# Patient Record
Sex: FEMALE | Race: BLACK OR AFRICAN AMERICAN | NOT HISPANIC OR LATINO | RURAL
[De-identification: names, ages, dates, MRNs, and addresses within clinical notes are randomized per-mention and may not be internally consistent; named-entity substitution may affect disease eponyms.]

---

## 2024-04-11 ENCOUNTER — PROCEDURE VISIT (OUTPATIENT)
Dept: OBSTETRICS AND GYNECOLOGY | Facility: CLINIC | Age: 24
End: 2024-04-11
Payer: COMMERCIAL

## 2024-04-11 ENCOUNTER — OFFICE VISIT (OUTPATIENT)
Dept: OBSTETRICS AND GYNECOLOGY | Facility: CLINIC | Age: 24
End: 2024-04-11
Payer: COMMERCIAL

## 2024-04-11 VITALS
TEMPERATURE: 98 F | HEART RATE: 79 BPM | BODY MASS INDEX: 21.19 KG/M2 | DIASTOLIC BLOOD PRESSURE: 81 MMHG | RESPIRATION RATE: 18 BRPM | WEIGHT: 135 LBS | SYSTOLIC BLOOD PRESSURE: 113 MMHG | OXYGEN SATURATION: 99 % | HEIGHT: 67 IN

## 2024-04-11 DIAGNOSIS — N91.1 SECONDARY AMENORRHEA: Primary | ICD-10-CM

## 2024-04-11 DIAGNOSIS — R11.0 NAUSEA: ICD-10-CM

## 2024-04-11 LAB
B-HCG UR QL: POSITIVE
CTP QC/QA: YES

## 2024-04-11 PROCEDURE — 99204 OFFICE O/P NEW MOD 45 MIN: CPT | Mod: ,,, | Performed by: ADVANCED PRACTICE MIDWIFE

## 2024-04-11 PROCEDURE — 99499 UNLISTED E&M SERVICE: CPT | Mod: ,,, | Performed by: OBSTETRICS & GYNECOLOGY

## 2024-04-11 PROCEDURE — 3008F BODY MASS INDEX DOCD: CPT | Mod: ,,, | Performed by: ADVANCED PRACTICE MIDWIFE

## 2024-04-11 PROCEDURE — 1159F MED LIST DOCD IN RCRD: CPT | Mod: ,,, | Performed by: ADVANCED PRACTICE MIDWIFE

## 2024-04-11 PROCEDURE — 3079F DIAST BP 80-89 MM HG: CPT | Mod: ,,, | Performed by: ADVANCED PRACTICE MIDWIFE

## 2024-04-11 PROCEDURE — 81025 URINE PREGNANCY TEST: CPT | Mod: QW,,, | Performed by: ADVANCED PRACTICE MIDWIFE

## 2024-04-11 PROCEDURE — 76801 OB US < 14 WKS SINGLE FETUS: CPT | Mod: ,,, | Performed by: OBSTETRICS & GYNECOLOGY

## 2024-04-11 PROCEDURE — 3074F SYST BP LT 130 MM HG: CPT | Mod: ,,, | Performed by: ADVANCED PRACTICE MIDWIFE

## 2024-04-11 RX ORDER — ONDANSETRON 8 MG/1
8 TABLET, ORALLY DISINTEGRATING ORAL 3 TIMES DAILY
Qty: 30 TABLET | Refills: 2 | Status: SHIPPED | OUTPATIENT
Start: 2024-04-11

## 2024-04-11 RX ORDER — ERGOCALCIFEROL 1.25 MG/1
50000 CAPSULE ORAL
Qty: 5 CAPSULE | Refills: 3 | Status: SHIPPED | OUTPATIENT
Start: 2024-04-11 | End: 2024-05-10

## 2024-04-11 NOTE — LETTER
April 11, 2024    Negro Park  [address] [address]              [facility]N  [address] [place] [id number]  Phone: [phone]  Fax: [phone]    To Whom It May Concern:    Ms. Park is currently under our care for pregnancy.  Estimated Date of Delivery: 12/08/2024        Sincerely,    Chuyita Toscano MA

## 2024-04-11 NOTE — PROGRESS NOTES
Missed Menses/ Possible Pregnancy  Patient complains of amenorrhea. She believes she could be pregnant. Pregnancy is desired. Sexual Activity: single partner, contraception: none. Current symptoms also include: fatigue, frequent urination, positive home pregnancy test, and cramping . Last period was normal.     Patient's last menstrual period was 2024 (exact date).     Review of patient's allergies indicates:   Allergen Reactions    Benadryl allergy-sinus Hives       Past Medical History:   Diagnosis Date    History of irritable bowel syndrome      History reviewed. No pertinent surgical history.  OB History          1    Para        Term                AB        Living             SAB        IAB        Ectopic        Multiple        Live Births                   Family History   Problem Relation Age of Onset    Ovarian cancer Maternal Grandmother     Diabetes Father     Hypertension Mother     Hypertension Sister      Social History     Tobacco Use    Smoking status: Former     Types: Cigarettes     Passive exposure: Never    Smokeless tobacco: Never   Substance Use Topics    Alcohol use: Not Currently     Genetic History reviewed    Current Outpatient Medications   Medication Sig    ergocalciferol (VITAMIN D2) 50,000 unit Cap Take 1 capsule (50,000 Units total) by mouth every 7 days. for 5 doses    ondansetron (ZOFRAN-ODT) 8 MG TbDL Take 1 tablet (8 mg total) by mouth 3 (three) times daily.    prenatal no118-iron-folic acid 29 mg iron- 1 mg Chew Take 1 tablet by mouth once daily.     No current facility-administered medications for this visit.       OB History    Para Term  AB Living   1             SAB IAB Ectopic Multiple Live Births                  # Outcome Date GA Lbr Tyson/2nd Weight Sex Delivery Anes PTL Lv   1 Current                 Review of Systems  ROS:  GENERAL: No fever, chills, fatigability or weight loss.  VULVAR: No pain, no lesions and no itching.  VAGINAL: No  relaxation, no itching, no discharge, no abnormal bleeding and no lesions.  ABDOMEN: No abdominal pain. Denies nausea. Denies vomiting. No diarrhea. No constipation  BREAST: Denies pain. No lumps. No discharge.  URINARY: No incontinence, no nocturia, no frequency and no dysuria.  CARDIOVASCULAR: No chest pain. No shortness of breath. No leg cramps.  NEUROLOGICAL: no headaches. No vision changes.    Objective:     PE:  AFFECT: Calm, alert and oriented X 3. Interactive during exam  GENERAL: Appears well-nourished, well-developed, in no acute distress.  HEAD: Normocephalic, atruamatic  TEETH: Good dentition.  THYROID: No thyromegally   BREASTS: No masses, skin changes, nipple discharge or adenopathy bilaterally.  SKIN: Normal for race, warm, & dry. No lesions or rashes.  LUNGS: Easy and unlabored, clear to auscultation bilaterally.  HEART: Regular rate and rhythm   EXTREMITIES: No cyanosis, clubbing or edema. No calf tenderness.    Pregnancy test: positive  EDC: 12/8/2024  US today: EDC 12/10/2024 @ 5 weeks 2 days, rincon, IUP, FHTs @ 100 bpm     Assessment:     Negro was seen today for routine prenatal visit.    Diagnoses and all orders for this visit:    Secondary amenorrhea  -     POCT urine pregnancy  -     ergocalciferol (VITAMIN D2) 50,000 unit Cap; Take 1 capsule (50,000 Units total) by mouth every 7 days. for 5 doses  -     prenatal no118-iron-folic acid 29 mg iron- 1 mg Chew; Take 1 tablet by mouth once daily.    Nausea  -     ondansetron (ZOFRAN-ODT) 8 MG TbDL; Take 1 tablet (8 mg total) by mouth 3 (three) times daily.        ICD-10-CM ICD-9-CM    1. Secondary amenorrhea  N91.1 626.0 POCT urine pregnancy      ergocalciferol (VITAMIN D2) 50,000 unit Cap      prenatal no118-iron-folic acid 29 mg iron- 1 mg Chew      2. Nausea  R11.0 787.02 ondansetron (ZOFRAN-ODT) 8 MG TbDL           Plan:     Oriented to practice  Bleeding precautions discussed. If noted, follow up a the emergency room or clinic if  scheduled for evaluation.  Counseled to avoid cat litter boxes.  Avoid gardening without gloves  Avoid half cooked, rare, or raw meats.  Avoid foods high in nitrites such as cold cuts- heat up any deli meats.  Seafood no more than 3 times per week or may eat large fish like tuna no more than once a week.  Avoid soft unpasteurized cheeses.  I recommend a PNV daily.    She should avoid ibuprofen, aleve, advil, BC powders.  Pink OB bag with prenatal book and information provided  Weight gain in pregnancy discussed  Vitamin D daily  Prenatal vitamins daily  B6 and unisom prn nausea  Verbalized understanding to all instructions and information  Questions answered to desired level of satisfaction      Follow up if symptoms worsen or fail to improve, for 1 wk for OB annual, f/u in 4 weeks NOB with labs and repeat US.    Mariia Reid, DNP, CNM, WHNP-BC

## 2024-04-18 ENCOUNTER — OFFICE VISIT (OUTPATIENT)
Dept: OBSTETRICS AND GYNECOLOGY | Facility: CLINIC | Age: 24
End: 2024-04-18
Payer: COMMERCIAL

## 2024-04-18 VITALS
BODY MASS INDEX: 20.9 KG/M2 | SYSTOLIC BLOOD PRESSURE: 106 MMHG | TEMPERATURE: 98 F | WEIGHT: 133.19 LBS | HEIGHT: 67 IN | DIASTOLIC BLOOD PRESSURE: 74 MMHG | OXYGEN SATURATION: 99 % | HEART RATE: 78 BPM

## 2024-04-18 DIAGNOSIS — Z12.4 ENCOUNTER FOR SCREENING FOR MALIGNANT NEOPLASM OF CERVIX: ICD-10-CM

## 2024-04-18 DIAGNOSIS — Z01.419 WELL WOMAN EXAM WITH ROUTINE GYNECOLOGICAL EXAM: Primary | ICD-10-CM

## 2024-04-18 DIAGNOSIS — Z11.3 SCREEN FOR SEXUALLY TRANSMITTED DISEASES: ICD-10-CM

## 2024-04-18 DIAGNOSIS — Z72.51 HIGH RISK HETEROSEXUAL BEHAVIOR: ICD-10-CM

## 2024-04-18 LAB
CANDIDA SPECIES: NEGATIVE
GARDNERELLA: POSITIVE
TRICHOMONAS: NEGATIVE

## 2024-04-18 PROCEDURE — 87660 TRICHOMONAS VAGIN DIR PROBE: CPT | Mod: ,,, | Performed by: CLINICAL MEDICAL LABORATORY

## 2024-04-18 PROCEDURE — 87591 N.GONORRHOEAE DNA AMP PROB: CPT | Mod: ,,, | Performed by: CLINICAL MEDICAL LABORATORY

## 2024-04-18 PROCEDURE — 3008F BODY MASS INDEX DOCD: CPT | Mod: ,,, | Performed by: ADVANCED PRACTICE MIDWIFE

## 2024-04-18 PROCEDURE — 87491 CHLMYD TRACH DNA AMP PROBE: CPT | Mod: ,,, | Performed by: CLINICAL MEDICAL LABORATORY

## 2024-04-18 PROCEDURE — 3078F DIAST BP <80 MM HG: CPT | Mod: ,,, | Performed by: ADVANCED PRACTICE MIDWIFE

## 2024-04-18 PROCEDURE — 3074F SYST BP LT 130 MM HG: CPT | Mod: ,,, | Performed by: ADVANCED PRACTICE MIDWIFE

## 2024-04-18 PROCEDURE — 99459 PELVIC EXAMINATION: CPT | Mod: ,,, | Performed by: ADVANCED PRACTICE MIDWIFE

## 2024-04-18 PROCEDURE — 1159F MED LIST DOCD IN RCRD: CPT | Mod: ,,, | Performed by: ADVANCED PRACTICE MIDWIFE

## 2024-04-18 PROCEDURE — 87480 CANDIDA DNA DIR PROBE: CPT | Mod: ,,, | Performed by: CLINICAL MEDICAL LABORATORY

## 2024-04-18 PROCEDURE — 99395 PREV VISIT EST AGE 18-39: CPT | Mod: ,,, | Performed by: ADVANCED PRACTICE MIDWIFE

## 2024-04-18 PROCEDURE — 87510 GARDNER VAG DNA DIR PROBE: CPT | Mod: ,,, | Performed by: CLINICAL MEDICAL LABORATORY

## 2024-04-18 NOTE — PROGRESS NOTES
"CC: Here for pap smear, annual exam    Negro Park is a 23 y.o. female  presents for well woman exam.  LMP: Patient's last menstrual period was 2024 (exact date).. Menses are: normal. Denies any further issues, problems, or complaints.    Last mammogram: n/a  Colonoscopy: n/a    Past Medical History:   Diagnosis Date    History of irritable bowel syndrome      No past surgical history on file.  Social History     Socioeconomic History    Marital status: Significant Other   Tobacco Use    Smoking status: Former     Types: Cigarettes     Passive exposure: Never    Smokeless tobacco: Never   Substance and Sexual Activity    Alcohol use: Not Currently    Sexual activity: Not Currently     Partners: Male     Family History   Problem Relation Name Age of Onset    Ovarian cancer Maternal Grandmother      Diabetes Father      Hypertension Mother      Hypertension Sister       OB History          1    Para        Term                AB        Living             SAB        IAB        Ectopic        Multiple        Live Births                     /74   Pulse 78   Temp 98.2 °F (36.8 °C)   Ht 5' 7" (1.702 m)   Wt 60.4 kg (133 lb 3.2 oz)   LMP 2024 (Exact Date)   SpO2 99%   BMI 20.86 kg/m²       ROS:  GENERAL: Denies weight gain or weight loss. Feeling well overall.   SKIN: Denies rash or lesions.   HEAD: Denies head injury or headache.   NODES: Denies enlarged lymph nodes.   CHEST: Denies chest pain or shortness of breath.   CARDIOVASCULAR: Denies palpitations or left sided chest pain.   ABDOMEN: No abdominal pain, constipation, diarrhea, nausea, vomiting or rectal bleeding.   URINARY: No frequency, dysuria, hematuria, or burning on urination.  REPRODUCTIVE: See HPI.   BREASTS: The patient performs breast self-examination and denies pain, lumps, or nipple discharge.   HEMATOLOGIC: No easy bruisability or excessive bleeding.   MUSCULOSKELETAL: Denies joint pain or swelling. "   NEUROLOGIC: Denies syncope or weakness.   PSYCHIATRIC: Denies depression, anxiety or mood swings.    PHYSICAL EXAM:  APPEARANCE: Well nourished, well developed, in no acute distress.  AFFECT: WNL, alert and oriented x 3  SKIN: No acne or hirsutism  NECK: Neck symmetric without masses or thyromegaly  NODES: No inguinal, cervical, axillary, or femoral lymph node enlargement  CHEST: Good respiratory effect  ABDOMEN: Soft.  No tenderness or masses.  No hepatosplenomegaly.  No hernias.  BREASTS: Symmetrical, no skin changes or visible lesions.  No palpable masses, nipple discharge bilaterally.  PELVIC: Normal external genitalia without lesions.  Normal hair distribution.  Adequate perineal body, normal urethral meatus.  Vagina moist and well rugated without lesions, thick white discharge.  Cervix pink, without lesions, tenderness with thick white discharge.  No significant cystocele or rectocele.  Bimanual exam shows uterus to be normal size, regular, mobile and nontender.  Adnexa without masses or tenderness.  Chaperone present for exam.   EXTREMITIES: No edema.    Well woman exam with routine gynecological exam  -     ThinPrep Pap Test; Future; Expected date: 04/18/2024    Encounter for screening for malignant neoplasm of cervix  -     ThinPrep Pap Test; Future; Expected date: 04/18/2024    Screen for sexually transmitted diseases  -     Chlamydia/GC, PCR; Future; Expected date: 04/18/2024  -     Bacterial Vaginosis; Future; Expected date: 04/18/2024    High risk heterosexual behavior  -     Chlamydia/GC, PCR; Future; Expected date: 04/18/2024  -     Bacterial Vaginosis; Future; Expected date: 04/18/2024        ICD-10-CM ICD-9-CM    1. Well woman exam with routine gynecological exam  Z01.419 V72.31 ThinPrep Pap Test      2. Encounter for screening for malignant neoplasm of cervix  Z12.4 V76.2 ThinPrep Pap Test      3. Screen for sexually transmitted diseases  Z11.3 V74.5 Chlamydia/GC, PCR      Bacterial Vaginosis       4. High risk heterosexual behavior  Z72.51 V69.2 Chlamydia/GC, PCR      Bacterial Vaginosis          Patient was counseled today on A.C.S. Pap guidelines and recommendations for yearly pelvic exams, mammograms and monthly self breast exams; to see her PCP for other health maintenance.   Exercise regimen encouraged  Healthy food choices encouraged  Multivitamins daily  Vitamin D daily  Release of information signed and sent to obtain pap smear obtained [facility] in [place], [place].   Questions answered to desired level of satisfaction  Verbalized understanding to all information and instructions    Follow up in about 1 year (around 4/18/2025), or if symptoms worsen or fail to improve, for OB Annual.

## 2024-04-19 LAB
CHLAMYDIA BY PCR: NEGATIVE
N. GONORRHOEAE (GC) BY PCR: NEGATIVE

## 2024-04-22 RX ORDER — METRONIDAZOLE 500 MG/1
500 TABLET ORAL 2 TIMES DAILY
Qty: 14 TABLET | Refills: 0 | Status: SHIPPED | OUTPATIENT
Start: 2024-04-22 | End: 2024-04-29

## 2024-05-05 NOTE — PROCEDURES
New OB Ultrasound note:      Uterus 6.61 x 4.69 x 4.58 cm     Gestational sac 5 weeks 2 day   Fetal heart rate 100 beats per minute      Impression:    IUP with fetal heart tones   Estimated gestational age 5 weeks 2 day  Estimated delivery date December 10,2024  Early IUP   Recommend repeat ultrasound in 1 week

## 2024-05-08 ENCOUNTER — ROUTINE PRENATAL (OUTPATIENT)
Dept: OBSTETRICS AND GYNECOLOGY | Facility: CLINIC | Age: 24
End: 2024-05-08
Payer: COMMERCIAL

## 2024-05-08 ENCOUNTER — PROCEDURE VISIT (OUTPATIENT)
Dept: OBSTETRICS AND GYNECOLOGY | Facility: CLINIC | Age: 24
End: 2024-05-08
Payer: COMMERCIAL

## 2024-05-08 VITALS
DIASTOLIC BLOOD PRESSURE: 74 MMHG | SYSTOLIC BLOOD PRESSURE: 109 MMHG | HEART RATE: 75 BPM | WEIGHT: 130.38 LBS | BODY MASS INDEX: 20.42 KG/M2

## 2024-05-08 DIAGNOSIS — Z36.89 ENCOUNTER FOR OTHER SPECIFIED ANTENATAL SCREENING: ICD-10-CM

## 2024-05-08 DIAGNOSIS — N91.1 SECONDARY AMENORRHEA: Primary | ICD-10-CM

## 2024-05-08 DIAGNOSIS — Z11.3 SCREEN FOR SEXUALLY TRANSMITTED DISEASES: ICD-10-CM

## 2024-05-08 DIAGNOSIS — E55.9 VITAMIN D DEFICIENCY, UNSPECIFIED: ICD-10-CM

## 2024-05-08 DIAGNOSIS — Z12.4 ENCOUNTER FOR SCREENING FOR MALIGNANT NEOPLASM OF CERVIX: ICD-10-CM

## 2024-05-08 DIAGNOSIS — Z34.01 ENCOUNTER FOR SUPERVISION OF NORMAL FIRST PREGNANCY IN FIRST TRIMESTER: Primary | ICD-10-CM

## 2024-05-08 DIAGNOSIS — Z11.4 SCREENING FOR HIV (HUMAN IMMUNODEFICIENCY VIRUS): ICD-10-CM

## 2024-05-08 DIAGNOSIS — Z72.51 HIGH RISK HETEROSEXUAL BEHAVIOR: ICD-10-CM

## 2024-05-08 DIAGNOSIS — Z3A.09 9 WEEKS GESTATION OF PREGNANCY: ICD-10-CM

## 2024-05-08 DIAGNOSIS — Z01.419 WELL WOMAN EXAM WITH ROUTINE GYNECOLOGICAL EXAM: ICD-10-CM

## 2024-05-08 LAB
25(OH)D3 SERPL-MCNC: 22.8 NG/ML
BASOPHILS # BLD AUTO: 0.02 K/UL (ref 0–0.2)
BASOPHILS NFR BLD AUTO: 0.3 % (ref 0–1)
BILIRUB SERPL-MCNC: NORMAL MG/DL
BLOOD, POC UA: NORMAL
DIFFERENTIAL METHOD BLD: ABNORMAL
EOSINOPHIL # BLD AUTO: 0.06 K/UL (ref 0–0.5)
EOSINOPHIL NFR BLD AUTO: 0.8 % (ref 1–4)
ERYTHROCYTE [DISTWIDTH] IN BLOOD BY AUTOMATED COUNT: 13.1 % (ref 11.5–14.5)
EST. AVERAGE GLUCOSE BLD GHB EST-MCNC: 108 MG/DL
GLUCOSE UR QL STRIP: NORMAL
HBA1C MFR BLD HPLC: 5.4 % (ref 4.5–6.6)
HBV SURFACE AG SERPL QL IA: NORMAL
HCT VFR BLD AUTO: 37.2 % (ref 38–47)
HGB BLD-MCNC: 11.9 G/DL (ref 12–16)
HIV 1+O+2 AB SERPL QL: NORMAL
IMM GRANULOCYTES # BLD AUTO: 0.04 K/UL (ref 0–0.04)
IMM GRANULOCYTES NFR BLD: 0.5 % (ref 0–0.4)
INDIRECT COOMBS: NORMAL
KETONES UR QL STRIP: NORMAL
LEUKOCYTE ESTERASE URINE, POC: NORMAL
LYMPHOCYTES # BLD AUTO: 1.96 K/UL (ref 1–4.8)
LYMPHOCYTES NFR BLD AUTO: 25 % (ref 27–41)
MCH RBC QN AUTO: 27 PG (ref 27–31)
MCHC RBC AUTO-ENTMCNC: 32 G/DL (ref 32–36)
MCV RBC AUTO: 84.4 FL (ref 80–96)
MONOCYTES # BLD AUTO: 0.95 K/UL (ref 0–0.8)
MONOCYTES NFR BLD AUTO: 12.1 % (ref 2–6)
MPC BLD CALC-MCNC: 10.5 FL (ref 9.4–12.4)
NEUTROPHILS # BLD AUTO: 4.82 K/UL (ref 1.8–7.7)
NEUTROPHILS NFR BLD AUTO: 61.3 % (ref 53–65)
NITRITE, POC UA: NORMAL
NRBC # BLD AUTO: 0 X10E3/UL
NRBC, AUTO (.00): 0 %
PH, POC UA: 7
PLATELET # BLD AUTO: 288 K/UL (ref 150–400)
PROTEIN, POC: NORMAL
RBC # BLD AUTO: 4.41 M/UL (ref 4.2–5.4)
RH BLD: NORMAL
RUBV IGG SER-ACNC: NORMAL [IU]/ML
SPECIFIC GRAVITY, POC UA: 1.02
SPECIMEN OUTDATE: NORMAL
SYPHILIS AB INTERPRETATION: NORMAL
UROBILINOGEN, POC UA: 0.2
WBC # BLD AUTO: 7.85 K/UL (ref 4.5–11)

## 2024-05-08 PROCEDURE — 85025 COMPLETE CBC W/AUTO DIFF WBC: CPT | Mod: ,,, | Performed by: CLINICAL MEDICAL LABORATORY

## 2024-05-08 PROCEDURE — 87086 URINE CULTURE/COLONY COUNT: CPT | Mod: ,,, | Performed by: CLINICAL MEDICAL LABORATORY

## 2024-05-08 PROCEDURE — 86780 TREPONEMA PALLIDUM: CPT | Mod: ,,, | Performed by: CLINICAL MEDICAL LABORATORY

## 2024-05-08 PROCEDURE — 0501F PRENATAL FLOW SHEET: CPT | Mod: ,,, | Performed by: ADVANCED PRACTICE MIDWIFE

## 2024-05-08 PROCEDURE — 86901 BLOOD TYPING SEROLOGIC RH(D): CPT | Mod: ,,, | Performed by: CLINICAL MEDICAL LABORATORY

## 2024-05-08 PROCEDURE — 83036 HEMOGLOBIN GLYCOSYLATED A1C: CPT | Mod: ,,, | Performed by: CLINICAL MEDICAL LABORATORY

## 2024-05-08 PROCEDURE — 36415 COLL VENOUS BLD VENIPUNCTURE: CPT | Mod: ,,, | Performed by: ADVANCED PRACTICE MIDWIFE

## 2024-05-08 PROCEDURE — 85660 RBC SICKLE CELL TEST: CPT | Mod: ,,, | Performed by: CLINICAL MEDICAL LABORATORY

## 2024-05-08 PROCEDURE — 86900 BLOOD TYPING SEROLOGIC ABO: CPT | Mod: ,,, | Performed by: CLINICAL MEDICAL LABORATORY

## 2024-05-08 PROCEDURE — 99499 UNLISTED E&M SERVICE: CPT | Mod: ,,, | Performed by: OBSTETRICS & GYNECOLOGY

## 2024-05-08 PROCEDURE — 82306 VITAMIN D 25 HYDROXY: CPT | Mod: ,,, | Performed by: CLINICAL MEDICAL LABORATORY

## 2024-05-08 PROCEDURE — 87389 HIV-1 AG W/HIV-1&-2 AB AG IA: CPT | Mod: ,,, | Performed by: CLINICAL MEDICAL LABORATORY

## 2024-05-08 PROCEDURE — 86762 RUBELLA ANTIBODY: CPT | Mod: ,,, | Performed by: CLINICAL MEDICAL LABORATORY

## 2024-05-08 PROCEDURE — 76801 OB US < 14 WKS SINGLE FETUS: CPT | Mod: ,,, | Performed by: OBSTETRICS & GYNECOLOGY

## 2024-05-08 PROCEDURE — G0481 DRUG TEST DEF 8-14 CLASSES: HCPCS | Mod: ,,, | Performed by: CLINICAL MEDICAL LABORATORY

## 2024-05-08 PROCEDURE — 87340 HEPATITIS B SURFACE AG IA: CPT | Mod: ,,, | Performed by: CLINICAL MEDICAL LABORATORY

## 2024-05-08 PROCEDURE — 86850 RBC ANTIBODY SCREEN: CPT | Mod: ,,, | Performed by: CLINICAL MEDICAL LABORATORY

## 2024-05-08 PROCEDURE — 88142 CYTOPATH C/V THIN LAYER: CPT | Mod: TC,GCY | Performed by: ADVANCED PRACTICE MIDWIFE

## 2024-05-08 RX ORDER — FERROUS SULFATE 325(65) MG
325 TABLET, DELAYED RELEASE (ENTERIC COATED) ORAL 2 TIMES DAILY
Qty: 60 TABLET | Refills: 4 | Status: SHIPPED | OUTPATIENT
Start: 2024-05-08

## 2024-05-08 NOTE — PROGRESS NOTES
23 y.o. female  at 9w3d   She c/o no problems  Reports no fetal movement or fluttering. Denies any vaginal bleeding, leakage of fluid, cramping, contractions, or pressure.   Total weight gain/weight loss in pregnancy: -2.087 kg (-4 lb 9.6 oz)     Vitals  BP: 109/74  Pulse: 75  Weight: 59.1 kg (130 lb 6.4 oz)  Prenatal  Fundal Height (cm): 10 cm  Fetal Heart Rate: 166  Movement: Absent  Urine Albumin/Glucose  Urine Albumin: Negative  Urine Glucose: Negative  Edema  LLE Edema: None  RLE Edema: None  Facial: None  Additional Edema?: No  Cervical Exam  Dilation: Closed  Effacement (%): 0  Station: -3  Station (Labor Curve): 8 cm  Dilation/Effacement/Station  Dilation: Closed  Effacement (%): 0  Station: -3    Prenatal Labs:  Lab Results   Component Value Date    LABNGO Negative 2024       A: 9w3d           ICD-10-CM ICD-9-CM    1. Encounter for supervision of normal first pregnancy in first trimester  Z34.01 V22.0 Type & Screen      CBC Auto Differential      Rubella Antibody Screen      Urine culture      Sickle Cell Screen      Hemoglobin A1C      Type & Screen      CBC Auto Differential      Rubella Antibody Screen      Sickle Cell Screen      Hemoglobin A1C      Urine culture      2. High risk heterosexual behavior  Z72.51 V69.2 Hepatitis B Surface Antigen      Treponema Pallidum (Syphillis) Antibody      Syphilis Antibody with reflex to RPR      Hepatitis B Surface Antigen      Treponema Pallidum (Syphillis) Antibody      CANCELED: Syphilis Antibody with reflex to RPR      3. Screening for HIV (human immunodeficiency virus)  Z11.4 V73.89 HIV 1/2 Ag/Ab (4th Gen)      HIV 1/2 Ag/Ab (4th Gen)      4. Vitamin D deficiency, unspecified  E55.9 268.9 Vitamin D      Vitamin D      5. Encounter for other specified  screening  Z36.89 V28.9 Drug Screen Definitive 14, Urine      Miscellaneous Test, Sendout TAMARA      6. 9 weeks gestation of pregnancy  Z3A.09 V22.2 POCT URINALYSIS      7. Screen for sexually  transmitted diseases  Z11.3 V74.5 Hepatitis B Surface Antigen      HIV 1/2 Ag/Ab (4th Gen)      Treponema Pallidum (Syphillis) Antibody      Syphilis Antibody with reflex to RPR      Hepatitis B Surface Antigen      HIV 1/2 Ag/Ab (4th Gen)      Treponema Pallidum (Syphillis) Antibody      CANCELED: Syphilis Antibody with reflex to RPR      8. Well woman exam with routine gynecological exam  Z01.419 V72.31 ThinPrep Pap Test      9. Encounter for screening for malignant neoplasm of cervix  Z12.4 V76.2 ThinPrep Pap Test          P: Bleeding, daily fetal kick counts, and  labor/labor precautions discussed.    The following were addressed during this visit:    1-8 Weeks  - Lifestyle Discussion   - Warning Signs   - Course of Care   - Physiology of Pregnancy   - Nutrition and Supplements   - Domestic Abuse Screen   - HIV Counseling   - Smoking Intervention   - SPAAD/Insurance Verification   - Importance of Exclusive Breastfeeding for First 6 Months   - Continuation of Breastfeeding of Complimentary after intro of solid foods   - Benefits of Breastfeeding     8-12 Weeks  - Review lab tests   - Genetic Counseling (NT/CVS/Amino)   - Influenza IM (for due date  - 3/31)   - Non-pharmacologic Pain Relief Methods for Labor & Birth       Questions answered to desired level of satisfaction  Verbalized understanding to all information and instructions provided.  Follow up in 4 weeks (on 2024), or if symptoms worsen or fail to improve, for Annual Exam, CALVIN Reid, DNP, CNM, WHNP-BC

## 2024-05-09 LAB — HGB S BLD QL SOLY: NEGATIVE

## 2024-05-10 LAB
6-ACETYLMORPHINE, URINE (RUSH): NEGATIVE 10 NG/ML
7-AMINOCLONAZEPAM, URINE (RUSH): NEGATIVE 25 NG/ML
A-HYDROXYALPRAZOLAM, URINE (RUSH): NEGATIVE 25 NG/ML
AMPHET UR QL SCN: NEGATIVE
BENZOYLECGONINE, URINE (RUSH): NEGATIVE 100 NG/ML
BUPRENORPHINE UR QL SCN: NEGATIVE 25 NG/ML
CODEINE, URINE (RUSH): NEGATIVE 25 NG/ML
CREAT UR-MCNC: 198 MG/DL (ref 28–219)
EDDP, URINE (RUSH): NEGATIVE 25 NG/ML
FENTANYL, URINE (RUSH): NEGATIVE 2.5 NG/ML
GH SERPL-MCNC: NORMAL NG/ML
HYDROCODONE, URINE (RUSH): NEGATIVE 25 NG/ML
HYDROMORPHONE, URINE (RUSH): NEGATIVE 25 NG/ML
INSULIN SERPL-ACNC: NORMAL U[IU]/ML
LAB AP CLINICAL INFORMATION: NORMAL
LAB AP GYN INTERPRETATION: NEGATIVE
LAB AP PAP DISCLAIMER COMMENTS: NORMAL
METHADONE UR QL SCN: NEGATIVE 25 NG/ML
METHAMPHET UR QL SCN: NEGATIVE
MORPHINE, URINE (RUSH): NEGATIVE 25 NG/ML
NORBUPRENORPHINE, URINE (RUSH): NEGATIVE 25 NG/ML
NORDIAZEPAM, URINE (RUSH): NEGATIVE 25 NG/ML
NORFENTANYL OXALATE, URINE (RUSH): NEGATIVE 5 NG/ML
NORHYDROCODONE, URINE (RUSH): NEGATIVE 50 NG/ML
NOROXYCODONE HCL, URINE (RUSH): NEGATIVE 50 NG/ML
OXYCODONE UR QL SCN: NEGATIVE 25 NG/ML
OXYMORPHONE, URINE (RUSH): NEGATIVE 25 NG/ML
PH UR STRIP: 7 PH UNITS
RENIN PLAS-CCNC: NORMAL NG/ML/H
SP GR UR STRIP: 1.02
TAPENTADOL, URINE (RUSH): NEGATIVE 25 NG/ML
TEMAZEPAM, URINE (RUSH): NEGATIVE 25 NG/ML
THC-COOH, URINE (RUSH): 88.1 25 NG/ML
TRAMADOL, URINE (RUSH): NEGATIVE 100 NG/ML
UA COMPLETE W REFLEX CULTURE PNL UR: NORMAL

## 2024-06-05 ENCOUNTER — ROUTINE PRENATAL (OUTPATIENT)
Dept: OBSTETRICS AND GYNECOLOGY | Facility: CLINIC | Age: 24
End: 2024-06-05
Payer: COMMERCIAL

## 2024-06-05 VITALS
WEIGHT: 129.63 LBS | SYSTOLIC BLOOD PRESSURE: 94 MMHG | DIASTOLIC BLOOD PRESSURE: 65 MMHG | BODY MASS INDEX: 20.3 KG/M2 | HEART RATE: 93 BPM

## 2024-06-05 DIAGNOSIS — E55.9 VITAMIN D DEFICIENCY, UNSPECIFIED: ICD-10-CM

## 2024-06-05 DIAGNOSIS — Z86.19 HISTORY OF HERPES GENITALIS: ICD-10-CM

## 2024-06-05 DIAGNOSIS — R63.4 WEIGHT LOSS: ICD-10-CM

## 2024-06-05 DIAGNOSIS — Z3A.13 13 WEEKS GESTATION OF PREGNANCY: Primary | ICD-10-CM

## 2024-06-05 LAB
BILIRUB SERPL-MCNC: NORMAL MG/DL
BLOOD, POC UA: NORMAL
GLUCOSE UR QL STRIP: NORMAL
KETONES UR QL STRIP: NORMAL
LEUKOCYTE ESTERASE URINE, POC: NORMAL
NITRITE, POC UA: NORMAL
PH, POC UA: 7
PROTEIN, POC: NORMAL
SPECIFIC GRAVITY, POC UA: 1.02
UROBILINOGEN, POC UA: 0.2

## 2024-06-05 PROCEDURE — 86696 HERPES SIMPLEX TYPE 2 TEST: CPT | Mod: ,,, | Performed by: CLINICAL MEDICAL LABORATORY

## 2024-06-05 PROCEDURE — 86695 HERPES SIMPLEX TYPE 1 TEST: CPT | Mod: ,,, | Performed by: CLINICAL MEDICAL LABORATORY

## 2024-06-05 PROCEDURE — 0502F SUBSEQUENT PRENATAL CARE: CPT | Mod: ,,, | Performed by: ADVANCED PRACTICE MIDWIFE

## 2024-06-05 PROCEDURE — 36415 COLL VENOUS BLD VENIPUNCTURE: CPT | Mod: ,,, | Performed by: ADVANCED PRACTICE MIDWIFE

## 2024-06-05 NOTE — PROGRESS NOTES
"23 y.o. female  at 13w3d   She c/o requests to be tested for genital herpes. States she was told in the past she had herpes but "to get it retested. They didn't give me any medicines or anything". She denies any recent outbreaks with last outbreak 10/2019.  Reports no fetal movement or fluttering. Denies any vaginal bleeding, leakage of fluid, cramping, contractions, or pressure.   Total weight gain/weight loss in pregnancy: -2.449 kg (-5 lb 6.4 oz)     Vitals  BP: 94/65  Pulse: 93  Weight: 58.8 kg (129 lb 9.6 oz)  Prenatal  Fundal Height (cm): 13 cm  Fetal Heart Rate: 145  Movement: Absent  Urine Albumin/Glucose  Urine Albumin: Negative  Urine Glucose: Negative  Edema  LLE Edema: None  RLE Edema: None  Facial: None  Additional Edema?: No    Prenatal Labs:  Lab Results   Component Value Date    GROUPTRH O POS 2024    HGB 11.9 (L) 2024    HCT 37.2 (L) 2024     2024    SICKLE Negative 2024    HEPBSAG Non-Reactive 2024    [id number] Non-Reactive 2024    LABNGO Negative 2024    LABURIN Skin/Urogenital Olamide Isolated, no further workup. 2024       A: 13w3d           ICD-10-CM ICD-9-CM    1. 13 weeks gestation of pregnancy  Z3A.13 V22.2 POCT URINALYSIS      2. History of herpes genitalis  Z86.19 V12.09 HSV 1 & 2, IgG      3. Vitamin D deficiency, unspecified  E55.9 268.9       4. Weight loss  R63.4 783.21           P: Bleeding, daily fetal kick counts, and  labor/labor precautions discussed.    The following were addressed during this visit:    13-16 Weeks  - Quad screen   - Anatomy Ultrasound   - Breastfeeding Concerns & Resources   - Importance of Early Skin to Skin Contact       Questions answered to desired level of satisfaction  Verbalized understanding to all information and instructions provided.  Follow up in about 4 weeks (around 7/3/2024), or if symptoms worsen or fail to improve, for CALVIN Reid, MARLENE, CNM, WHNP-BC                "

## 2024-06-06 LAB
HSV TYPE 1 AB IGG INDEX: 0.18
HSV TYPE 2 AB IGG INDEX: 7.84
HSV1 IGG SER QL: NEGATIVE
HSV2 IGG SER QL: POSITIVE

## 2024-06-07 NOTE — PROCEDURES
New OB Ultrasound note:      Uterus 9.54 x 6.84 x 7.95 cm    Crown-rump length 9 weeks 4 day  Fetal heart rate 162 beats per minute     Impression:    IUP with fetal heart tones   Estimated gestational age 9 weeks 4 day  Estimated delivery date December 7, 2024

## 2024-06-10 ENCOUNTER — TELEPHONE (OUTPATIENT)
Dept: OBSTETRICS AND GYNECOLOGY | Facility: CLINIC | Age: 24
End: 2024-06-10
Payer: COMMERCIAL

## 2024-06-10 NOTE — TELEPHONE ENCOUNTER
Spoke with patient via phone and she was aware that she has a diagnosis of HSV II and the provider will give her medication later on in the pregnancy. Patient stated understanding.    ----- Message from Mariia Reid CNM sent at 6/10/2024  3:51 PM CDT -----  Notify of HSV 2 results are positive.

## 2024-07-03 ENCOUNTER — ROUTINE PRENATAL (OUTPATIENT)
Dept: OBSTETRICS AND GYNECOLOGY | Facility: CLINIC | Age: 24
End: 2024-07-03
Payer: COMMERCIAL

## 2024-07-03 VITALS
SYSTOLIC BLOOD PRESSURE: 96 MMHG | WEIGHT: 137.19 LBS | BODY MASS INDEX: 21.49 KG/M2 | DIASTOLIC BLOOD PRESSURE: 64 MMHG | HEART RATE: 90 BPM

## 2024-07-03 DIAGNOSIS — Z36.89 ENCOUNTER FOR FETAL ANATOMIC SURVEY: Primary | ICD-10-CM

## 2024-07-03 DIAGNOSIS — Z3A.17 17 WEEKS GESTATION OF PREGNANCY: ICD-10-CM

## 2024-07-03 LAB
BILIRUB SERPL-MCNC: NEGATIVE MG/DL
BLOOD, POC UA: NEGATIVE
GLUCOSE UR QL STRIP: NEGATIVE
KETONES UR QL STRIP: NEGATIVE
LEUKOCYTE ESTERASE URINE, POC: NEGATIVE
NITRITE, POC UA: NEGATIVE
PH, POC UA: 6.5
PROTEIN, POC: NEGATIVE
SPECIFIC GRAVITY, POC UA: 1.02
UROBILINOGEN, POC UA: 1

## 2024-07-03 PROCEDURE — 0502F SUBSEQUENT PRENATAL CARE: CPT | Mod: ,,, | Performed by: ADVANCED PRACTICE MIDWIFE

## 2024-07-03 NOTE — PROGRESS NOTES
23 y.o. female  at 17w3d   She c/o no problems  Reports good fetal movement or fluttering. Denies any vaginal bleeding, leakage of fluid, cramping, contractions, or pressure.   Total weight gain/weight loss in pregnancy: 0.998 kg (2 lb 3.2 oz)     Vitals  BP: 96/64  Pulse: 90  Weight: 62.2 kg (137 lb 3.2 oz)  Prenatal  Fundal Height (cm): 18 cm  Fetal Heart Rate: 156  Movement: Absent  Urine Albumin/Glucose  Urine Albumin: Negative  Urine Glucose: Negative  Edema  LLE Edema: None  RLE Edema: None  Facial: None  Additional Edema?: No    Prenatal Labs:  Lab Results   Component Value Date    GROUPTRH O POS 2024    HGB 11.9 (L) 2024    HCT 37.2 (L) 2024     2024    SICKLE Negative 2024    HEPBSAG Non-Reactive 2024    [id number] Non-Reactive 2024    LABNGO Negative 2024    LABURIN Skin/Urogenital Olamide Isolated, no further workup. 2024       A: 17w3d           ICD-10-CM ICD-9-CM    1. Encounter for fetal anatomic survey  Z36.89 V28.81 US OB 14+ Wks, TransAbd, Single Gestation      2. 17 weeks gestation of pregnancy  Z3A.17 V22.2 POCT URINALYSIS          P: Bleeding, daily fetal kick counts, and  labor/labor precautions discussed.    The following were addressed during this visit:    17-20 Weeks  - Quickening   - Lifestyle   - Ultrasound   - Importance of Early and Frequent Breastfeeding   - Baby-led Feeding   - Frequent feeding to help assure optimal milk production       Questions answered to desired level of satisfaction  Verbalized understanding to all information and instructions provided.  Follow up in about 4 weeks (around 2024), or if symptoms worsen or fail to improve, for CALVIN Reid, MARLENE, CNM, WHNP-BC

## 2024-07-31 ENCOUNTER — HOSPITAL ENCOUNTER (OUTPATIENT)
Dept: RADIOLOGY | Facility: HOSPITAL | Age: 24
Discharge: HOME | End: 2024-07-31
Attending: ADVANCED PRACTICE MIDWIFE
Payer: MEDICAID

## 2024-07-31 DIAGNOSIS — Z36.89 ENCOUNTER FOR FETAL ANATOMIC SURVEY: ICD-10-CM

## 2024-07-31 PROCEDURE — 76805 OB US >/= 14 WKS SNGL FETUS: CPT | Mod: TC

## 2024-08-14 ENCOUNTER — ROUTINE PRENATAL (OUTPATIENT)
Dept: OBSTETRICS AND GYNECOLOGY | Facility: CLINIC | Age: 24
End: 2024-08-14
Payer: MEDICAID

## 2024-08-14 VITALS
WEIGHT: 150.38 LBS | BODY MASS INDEX: 23.56 KG/M2 | HEART RATE: 90 BPM | SYSTOLIC BLOOD PRESSURE: 104 MMHG | DIASTOLIC BLOOD PRESSURE: 67 MMHG

## 2024-08-14 DIAGNOSIS — Z3A.23 23 WEEKS GESTATION OF PREGNANCY: Primary | ICD-10-CM

## 2024-08-14 LAB
BILIRUB SERPL-MCNC: NEGATIVE MG/DL
BLOOD, POC UA: NORMAL
GLUCOSE UR QL STRIP: NEGATIVE
KETONES UR QL STRIP: NEGATIVE
LEUKOCYTE ESTERASE URINE, POC: NEGATIVE
NITRITE, POC UA: NEGATIVE
PH, POC UA: 7
PROTEIN, POC: NEGATIVE
SPECIFIC GRAVITY, POC UA: 1.01
UROBILINOGEN, POC UA: 0.2

## 2024-08-14 PROCEDURE — 0502F SUBSEQUENT PRENATAL CARE: CPT | Mod: ,,, | Performed by: ADVANCED PRACTICE MIDWIFE

## 2024-08-14 NOTE — PROGRESS NOTES
24 y.o. female  at 23w3d   She c/o no problems  Reports good fetal movement or fluttering. Denies any vaginal bleeding, leakage of fluid, cramping, contractions, or pressure.   Total weight gain/weight loss in pregnancy: 6.985 kg (15 lb 6.4 oz)     Vitals  BP: 104/67  Pulse: 90  Weight: 68.2 kg (150 lb 6.4 oz)  Prenatal  Fundal Height (cm): 25 cm  Fetal Heart Rate: 156  Movement: Present  Urine Albumin/Glucose  Urine Albumin: Negative  Urine Glucose: Negative  Edema  LLE Edema: None  RLE Edema: None  Facial: None  Additional Edema?: No    Prenatal Labs:  Lab Results   Component Value Date    GROUPTRH O POS 2024    HGB 11.9 (L) 2024    HCT 37.2 (L) 2024     2024    SICKLE Negative 2024    HEPBSAG Non-Reactive 2024    [id number] Non-Reactive 2024    LABNGO Negative 2024    LABURIN Skin/Urogenital Olamide Isolated, no further workup. 2024       A: 23w3d           ICD-10-CM ICD-9-CM    1. 23 weeks gestation of pregnancy  Z3A.23 V22.2 POCT URINALYSIS          P: Bleeding, daily fetal kick counts, and  labor/labor precautions discussed.    The following were addressed during this visit:    21-24 Weeks  -  Labor Signs   - Travel During Pregnancy   - Gestational diabetes screening protocol   - Effective Position and Latch   - Risks of Formula Use   - Risks of pacifier use       Questions answered to desired level of satisfaction  Verbalized understanding to all information and instructions provided.  Follow up in about 4 weeks (around 2024), or if symptoms worsen or fail to improve, for SOPHIE, 1 hr gtt.    Mariia Reid, MARLENE, CNM, WHNP-BC

## 2024-09-06 ENCOUNTER — TELEPHONE (OUTPATIENT)
Dept: OBSTETRICS AND GYNECOLOGY | Facility: CLINIC | Age: 24
End: 2024-09-06
Payer: MEDICAID

## 2024-09-06 NOTE — TELEPHONE ENCOUNTER
Patient called stating she has a sore throat, sinus congestion, and headache. Patient states she thought she had a fever last night but did not check it. Informed Pt Of OTC medications she can take for colds and sinus while pregnant/ Also informed pt to go to Immediate care to rule out strep or Covid/ Patient verbalized understanding.

## 2024-09-11 ENCOUNTER — ROUTINE PRENATAL (OUTPATIENT)
Dept: OBSTETRICS AND GYNECOLOGY | Facility: CLINIC | Age: 24
End: 2024-09-11
Payer: MEDICAID

## 2024-09-11 VITALS
SYSTOLIC BLOOD PRESSURE: 96 MMHG | DIASTOLIC BLOOD PRESSURE: 68 MMHG | BODY MASS INDEX: 24.78 KG/M2 | HEART RATE: 91 BPM | WEIGHT: 158.19 LBS

## 2024-09-11 DIAGNOSIS — Z36.89 ENCOUNTER FOR OTHER SPECIFIED ANTENATAL SCREENING: ICD-10-CM

## 2024-09-11 DIAGNOSIS — Z3A.27 27 WEEKS GESTATION OF PREGNANCY: Primary | ICD-10-CM

## 2024-09-11 LAB
BASOPHILS # BLD AUTO: 0.03 K/UL (ref 0–0.2)
BASOPHILS NFR BLD AUTO: 0.2 % (ref 0–1)
BILIRUB SERPL-MCNC: NEGATIVE MG/DL
BLOOD, POC UA: NEGATIVE
DIFFERENTIAL METHOD BLD: ABNORMAL
EOSINOPHIL # BLD AUTO: 0.1 K/UL (ref 0–0.5)
EOSINOPHIL NFR BLD AUTO: 0.8 % (ref 1–4)
ERYTHROCYTE [DISTWIDTH] IN BLOOD BY AUTOMATED COUNT: 13.2 % (ref 11.5–14.5)
GLUCOSE SERPL-MCNC: 108 MG/DL (ref 74–106)
GLUCOSE UR QL STRIP: NEGATIVE
HCT VFR BLD AUTO: 32.2 % (ref 38–47)
HGB BLD-MCNC: 9.9 G/DL (ref 12–16)
IMM GRANULOCYTES # BLD AUTO: 0.29 K/UL (ref 0–0.04)
IMM GRANULOCYTES NFR BLD: 2.3 % (ref 0–0.4)
KETONES UR QL STRIP: NEGATIVE
LEUKOCYTE ESTERASE URINE, POC: NEGATIVE
LYMPHOCYTES # BLD AUTO: 1.91 K/UL (ref 1–4.8)
LYMPHOCYTES NFR BLD AUTO: 15.5 % (ref 27–41)
MCH RBC QN AUTO: 27.5 PG (ref 27–31)
MCHC RBC AUTO-ENTMCNC: 30.7 G/DL (ref 32–36)
MCV RBC AUTO: 89.4 FL (ref 80–96)
MONOCYTES # BLD AUTO: 0.91 K/UL (ref 0–0.8)
MONOCYTES NFR BLD AUTO: 7.4 % (ref 2–6)
MPC BLD CALC-MCNC: 10.4 FL (ref 9.4–12.4)
NEUTROPHILS # BLD AUTO: 9.11 K/UL (ref 1.8–7.7)
NEUTROPHILS NFR BLD AUTO: 73.8 % (ref 53–65)
NITRITE, POC UA: NEGATIVE
NRBC # BLD AUTO: 0 X10E3/UL
NRBC, AUTO (.00): 0 %
PH, POC UA: 7
PLATELET # BLD AUTO: 292 K/UL (ref 150–400)
PROTEIN, POC: NEGATIVE
RBC # BLD AUTO: 3.6 M/UL (ref 4.2–5.4)
SPECIFIC GRAVITY, POC UA: 1.02
UROBILINOGEN, POC UA: 0.2
WBC # BLD AUTO: 12.35 K/UL (ref 4.5–11)

## 2024-09-11 PROCEDURE — 85025 COMPLETE CBC W/AUTO DIFF WBC: CPT | Mod: ,,, | Performed by: CLINICAL MEDICAL LABORATORY

## 2024-09-11 PROCEDURE — 36415 COLL VENOUS BLD VENIPUNCTURE: CPT | Mod: 90,,, | Performed by: ADVANCED PRACTICE MIDWIFE

## 2024-09-11 PROCEDURE — 82950 GLUCOSE TEST: CPT | Mod: ,,, | Performed by: CLINICAL MEDICAL LABORATORY

## 2024-09-11 PROCEDURE — 0502F SUBSEQUENT PRENATAL CARE: CPT | Mod: ,,, | Performed by: ADVANCED PRACTICE MIDWIFE

## 2024-09-11 NOTE — PROGRESS NOTES
24 y.o. female  at 27w3d   She c/o no problems.   Reports good fetal movement or fluttering. Denies any vaginal bleeding, leakage of fluid, cramping, contractions, or pressure.   Total weight gain/weight loss in pregnancy: 10.5 kg (23 lb 3.2 oz)     Vitals  BP: 96/68  Pulse: 91  Weight: 71.8 kg (158 lb 3.2 oz)  Prenatal  Fundal Height (cm): 27 cm  Fetal Heart Rate: 159  Movement: Present  Urine Albumin/Glucose  Urine Albumin: Negative  Urine Glucose: Negative  Edema  LLE Edema: None  RLE Edema: None  Facial: None  Additional Edema?: No    Prenatal Labs:  Lab Results   Component Value Date    GROUPTRH O POS 2024    HGB 11.9 (L) 2024    HCT 37.2 (L) 2024     2024    SICKLE Negative 2024    HEPBSAG Non-Reactive 2024    [id number] Non-Reactive 2024    LABNGO Negative 2024    LABURIN Skin/Urogenital Olamide Isolated, no further workup. 2024       A: 27w3d           ICD-10-CM ICD-9-CM    1. 27 weeks gestation of pregnancy  Z3A.27 V22.2 POCT URINALYSIS      Glucose, 1Hr Post Prandial      CBC Auto Differential          P: Bleeding, daily fetal kick counts, and  labor/labor precautions discussed.    The following were addressed during this visit:    25-28 Weeks  -  Labor Signs   - Childbirth Education   - Maternity Leave paperwork   - Smoking Intervention   - Weight Gain/Diet/Exercise   - Rhogam Given   - Rooming in baby during your hospital stay     1 hr gtt today  Questions answered to desired level of satisfaction  Verbalized understanding to all information and instructions provided.  Follow up in about 2 weeks (around 2024), or if symptoms worsen or fail to improve, for CALVIN Reid, MARLENE, CNM, WHNP-BC

## 2024-09-12 ENCOUNTER — TELEPHONE (OUTPATIENT)
Dept: OBSTETRICS AND GYNECOLOGY | Facility: CLINIC | Age: 24
End: 2024-09-12
Payer: MEDICAID

## 2024-09-12 RX ORDER — FERROUS SULFATE 325(65) MG
325 TABLET, DELAYED RELEASE (ENTERIC COATED) ORAL 2 TIMES DAILY
Qty: 60 TABLET | Refills: 4 | Status: SHIPPED | OUTPATIENT
Start: 2024-09-12

## 2024-09-12 NOTE — TELEPHONE ENCOUNTER
----- Message from Mariia Reid CNM sent at 9/12/2024  4:53 PM CDT -----  Anemia- rx sent, notify pt

## 2024-09-25 ENCOUNTER — ROUTINE PRENATAL (OUTPATIENT)
Dept: OBSTETRICS AND GYNECOLOGY | Facility: CLINIC | Age: 24
End: 2024-09-25
Payer: MEDICAID

## 2024-09-25 VITALS
SYSTOLIC BLOOD PRESSURE: 112 MMHG | DIASTOLIC BLOOD PRESSURE: 72 MMHG | HEART RATE: 103 BPM | WEIGHT: 164.81 LBS | BODY MASS INDEX: 25.81 KG/M2

## 2024-09-25 DIAGNOSIS — D72.829 LEUKOCYTOSIS, UNSPECIFIED TYPE: ICD-10-CM

## 2024-09-25 DIAGNOSIS — Z3A.29 29 WEEKS GESTATION OF PREGNANCY: Primary | ICD-10-CM

## 2024-09-25 LAB
ANISOCYTOSIS BLD QL SMEAR: ABNORMAL
BASOPHILS # BLD AUTO: 0.04 K/UL (ref 0–0.2)
BASOPHILS NFR BLD AUTO: 0.3 % (ref 0–1)
BILIRUB SERPL-MCNC: NEGATIVE MG/DL
BLOOD, POC UA: NEGATIVE
DIFFERENTIAL METHOD BLD: ABNORMAL
EOSINOPHIL # BLD AUTO: 0.14 K/UL (ref 0–0.5)
EOSINOPHIL NFR BLD AUTO: 0.9 % (ref 1–4)
ERYTHROCYTE [DISTWIDTH] IN BLOOD BY AUTOMATED COUNT: 13.3 % (ref 11.5–14.5)
GLUCOSE UR QL STRIP: NEGATIVE
HCT VFR BLD AUTO: 30.9 % (ref 38–47)
HGB BLD-MCNC: 9.7 G/DL (ref 12–16)
HYPOCHROMIA BLD QL SMEAR: ABNORMAL
IMM GRANULOCYTES # BLD AUTO: 0.32 K/UL (ref 0–0.04)
IMM GRANULOCYTES NFR BLD: 2.1 % (ref 0–0.4)
KETONES UR QL STRIP: NEGATIVE
LEUKOCYTE ESTERASE URINE, POC: NEGATIVE
LYMPHOCYTES # BLD AUTO: 2.06 K/UL (ref 1–4.8)
LYMPHOCYTES NFR BLD AUTO: 13.8 % (ref 27–41)
MCH RBC QN AUTO: 27.6 PG (ref 27–31)
MCHC RBC AUTO-ENTMCNC: 31.4 G/DL (ref 32–36)
MCV RBC AUTO: 87.8 FL (ref 80–96)
MONOCYTES # BLD AUTO: 1.67 K/UL (ref 0–0.8)
MONOCYTES NFR BLD AUTO: 11.2 % (ref 2–6)
MPC BLD CALC-MCNC: 10.9 FL (ref 9.4–12.4)
NEUTROPHILS # BLD AUTO: 10.67 K/UL (ref 1.8–7.7)
NEUTROPHILS NFR BLD AUTO: 71.7 % (ref 53–65)
NITRITE, POC UA: NEGATIVE
NRBC # BLD AUTO: 0 X10E3/UL
NRBC, AUTO (.00): 0 %
PH, POC UA: 7.5
PLATELET # BLD AUTO: 284 K/UL (ref 150–400)
PLATELET MORPHOLOGY: ABNORMAL
PROTEIN, POC: NEGATIVE
RBC # BLD AUTO: 3.52 M/UL (ref 4.2–5.4)
SPECIFIC GRAVITY, POC UA: 1.02
UROBILINOGEN, POC UA: 0.2
WBC # BLD AUTO: 14.9 K/UL (ref 4.5–11)

## 2024-09-25 PROCEDURE — 85025 COMPLETE CBC W/AUTO DIFF WBC: CPT | Mod: ,,, | Performed by: CLINICAL MEDICAL LABORATORY

## 2024-09-25 PROCEDURE — 0502F SUBSEQUENT PRENATAL CARE: CPT | Mod: ,,, | Performed by: ADVANCED PRACTICE MIDWIFE

## 2024-09-25 PROCEDURE — 36415 COLL VENOUS BLD VENIPUNCTURE: CPT | Mod: 90,,, | Performed by: ADVANCED PRACTICE MIDWIFE

## 2024-09-25 RX ORDER — VALACYCLOVIR HYDROCHLORIDE 1 G/1
1000 TABLET, FILM COATED ORAL DAILY
Qty: 30 TABLET | Refills: 11 | Status: SHIPPED | OUTPATIENT
Start: 2024-09-25 | End: 2024-10-25

## 2024-09-25 NOTE — PROGRESS NOTES
24 y.o. female  at 29w3d   She c/o cramping and constipation- improved with having a bowel movement- increase fiber and fluids  Reports uzma fetal movement or fluttering. Denies any vaginal bleeding, leakage of fluid, cramping, contractions, or pressure.   Total weight gain/weight loss in pregnancy: 13.5 kg (29 lb 12.8 oz)     Vitals  BP: 112/72  Pulse: 103  Weight: 74.8 kg (164 lb 12.8 oz)  Prenatal  Fundal Height (cm): 39 cm  Fetal Heart Rate: 158  Movement: Present  Urine Albumin/Glucose  Urine Albumin: Negative  Urine Glucose: Negative  Edema  LLE Edema: None  RLE Edema: None  Facial: None  Additional Edema?: No    Prenatal Labs:  Lab Results   Component Value Date    GROUPTRH O POS 2024    HGB 9.9 (L) 2024    HCT 32.2 (L) 2024     2024    SICKLE Negative 2024    HEPBSAG Non-Reactive 2024    [id number] Non-Reactive 2024    LABNGO Negative 2024    LABURIN Skin/Urogenital Olamide Isolated, no further workup. 2024       A: 29w3d           ICD-10-CM ICD-9-CM    1. 29 weeks gestation of pregnancy  Z3A.29 V22.2 POCT URINALYSIS      2. Leukocytosis, unspecified type  D72.829 288.60 CBC Auto Differential          P: Bleeding, daily fetal kick counts, and  labor/labor precautions discussed.    The following were addressed during this visit:    29-32 Weeks  - Tdap Given   - Contraception/Tubal Consent   - Pre-registration   - Circumsision plans   - Op note review/ consent   - Birth Plan   - Pediatrician   - Fetal Kick Counts/PIH/PTL precautions   - Preeclampsia Education   - Quiet time     Labs reviewed. Increase iron rich foods in diet.   Questions answered to desired level of satisfaction  Verbalized understanding to all information and instructions provided.  Follow up in about 2 weeks (around 10/9/2024), or if symptoms worsen or fail to improve, for CALVIN eRid, MARLENE, CNM, WHNP-BC

## 2024-09-26 RX ORDER — FERROUS SULFATE 325(65) MG
325 TABLET, DELAYED RELEASE (ENTERIC COATED) ORAL 2 TIMES DAILY
Qty: 60 TABLET | Refills: 4 | Status: SHIPPED | OUTPATIENT
Start: 2024-09-26

## 2024-10-09 ENCOUNTER — ROUTINE PRENATAL (OUTPATIENT)
Dept: OBSTETRICS AND GYNECOLOGY | Facility: CLINIC | Age: 24
End: 2024-10-09
Payer: MEDICAID

## 2024-10-09 VITALS
DIASTOLIC BLOOD PRESSURE: 68 MMHG | WEIGHT: 172.63 LBS | BODY MASS INDEX: 27.03 KG/M2 | HEART RATE: 105 BPM | SYSTOLIC BLOOD PRESSURE: 104 MMHG

## 2024-10-09 DIAGNOSIS — K21.9 GASTROESOPHAGEAL REFLUX DISEASE, UNSPECIFIED WHETHER ESOPHAGITIS PRESENT: ICD-10-CM

## 2024-10-09 DIAGNOSIS — Z3A.31 31 WEEKS GESTATION OF PREGNANCY: Primary | ICD-10-CM

## 2024-10-09 LAB
BILIRUB SERPL-MCNC: NEGATIVE MG/DL
BLOOD, POC UA: NEGATIVE
GLUCOSE UR QL STRIP: NEGATIVE
KETONES UR QL STRIP: NEGATIVE
LEUKOCYTE ESTERASE URINE, POC: NEGATIVE
NITRITE, POC UA: NEGATIVE
PH, POC UA: 7
PROTEIN, POC: NEGATIVE
SPECIFIC GRAVITY, POC UA: 1.02
UROBILINOGEN, POC UA: 0.2

## 2024-10-09 PROCEDURE — 0502F SUBSEQUENT PRENATAL CARE: CPT | Mod: ,,, | Performed by: ADVANCED PRACTICE MIDWIFE

## 2024-10-09 NOTE — PROGRESS NOTES
24 y.o. female  at 31w3d   She c/o acid reflux   Reports good fetal movement or fluttering. Denies any vaginal bleeding, leakage of fluid, cramping, contractions, or pressure.   Total weight gain/weight loss in pregnancy: 17.1 kg (37 lb 9.6 oz)     Vitals  BP: 104/68  Pulse: 105  Weight: 78.3 kg (172 lb 9.6 oz)  Prenatal  Fundal Height (cm): 32 cm  Fetal Heart Rate: 145  Movement: Present  Urine Albumin/Glucose  Urine Albumin: Negative  Urine Glucose: Negative  Edema  LLE Edema: Moderate pitting, indentation subsides rapidly  RLE Edema: Moderate pitting, indentation subsides rapidly  Facial: None  Additional Edema?: No    Prenatal Labs:  Lab Results   Component Value Date    GROUPTRH O POS 2024    HGB 9.7 (L) 2024    HCT 30.9 (L) 2024     2024    SICKLE Negative 2024    HEPBSAG Non-Reactive 2024    [id number] Non-Reactive 2024    LABNGO Negative 2024    LABURIN Skin/Urogenital Olamide Isolated, no further workup. 2024       A: 31w3d           ICD-10-CM ICD-9-CM    1. 31 weeks gestation of pregnancy  Z3A.31 V22.2 POCT URINALYSIS      2. Gastroesophageal reflux disease, unspecified whether esophagitis present  K21.9 530.81           P: Bleeding, daily fetal kick counts, and  labor/labor precautions discussed.    No pregnancy checklist tasks were completed during this visit, and no tasks are pending completion.    Sit up after meals for approximately 1 1/2-2 hrs  Take omperazole as ordered  Questions answered to desired level of satisfaction  Verbalized understanding to all information and instructions provided.  Follow up in about 2 weeks (around 10/23/2024), or if symptoms worsen or fail to improve, for SOPHIE, Ultrasound.    Mariia Reid, MARLENE, CNM, WHNP-BC

## 2024-10-23 ENCOUNTER — ROUTINE PRENATAL (OUTPATIENT)
Dept: OBSTETRICS AND GYNECOLOGY | Facility: CLINIC | Age: 24
End: 2024-10-23
Payer: MEDICAID

## 2024-10-23 ENCOUNTER — PROCEDURE VISIT (OUTPATIENT)
Dept: OBSTETRICS AND GYNECOLOGY | Facility: CLINIC | Age: 24
End: 2024-10-23
Payer: MEDICAID

## 2024-10-23 VITALS
BODY MASS INDEX: 27.97 KG/M2 | DIASTOLIC BLOOD PRESSURE: 73 MMHG | SYSTOLIC BLOOD PRESSURE: 107 MMHG | WEIGHT: 178.63 LBS | HEART RATE: 103 BPM

## 2024-10-23 DIAGNOSIS — R10.11 RIGHT UPPER QUADRANT ABDOMINAL PAIN: ICD-10-CM

## 2024-10-23 DIAGNOSIS — R11.0 NAUSEA: ICD-10-CM

## 2024-10-23 DIAGNOSIS — Z3A.33 33 WEEKS GESTATION OF PREGNANCY: Primary | ICD-10-CM

## 2024-10-23 DIAGNOSIS — O36.8130 DECREASED FETAL MOVEMENTS IN THIRD TRIMESTER, SINGLE OR UNSPECIFIED FETUS: ICD-10-CM

## 2024-10-23 LAB
ALBUMIN SERPL BCP-MCNC: 2.8 G/DL (ref 3.5–5)
ALBUMIN/GLOB SERPL: 0.8 {RATIO}
ALP SERPL-CCNC: 141 U/L (ref 37–98)
ALT SERPL W P-5'-P-CCNC: 21 U/L (ref 13–56)
AMYLASE SERPL-CCNC: 94 U/L (ref 25–115)
ANION GAP SERPL CALCULATED.3IONS-SCNC: 12 MMOL/L (ref 7–16)
AST SERPL W P-5'-P-CCNC: 35 U/L (ref 15–37)
BASOPHILS # BLD AUTO: 0.06 K/UL (ref 0–0.2)
BASOPHILS NFR BLD AUTO: 0.4 % (ref 0–1)
BILIRUB SERPL-MCNC: 0.3 MG/DL (ref ?–1.2)
BILIRUB SERPL-MCNC: NEGATIVE MG/DL
BLOOD, POC UA: NEGATIVE
BUN SERPL-MCNC: 6 MG/DL (ref 7–18)
BUN/CREAT SERPL: 10 (ref 6–20)
CALCIUM SERPL-MCNC: 8.8 MG/DL (ref 8.5–10.1)
CHLORIDE SERPL-SCNC: 108 MMOL/L (ref 98–107)
CO2 SERPL-SCNC: 22 MMOL/L (ref 21–32)
CREAT SERPL-MCNC: 0.61 MG/DL (ref 0.55–1.02)
DIFFERENTIAL METHOD BLD: ABNORMAL
EGFR (NO RACE VARIABLE) (RUSH/TITUS): 128 ML/MIN/1.73M2
EOSINOPHIL # BLD AUTO: 0.26 K/UL (ref 0–0.5)
EOSINOPHIL NFR BLD AUTO: 1.6 % (ref 1–4)
ERYTHROCYTE [DISTWIDTH] IN BLOOD BY AUTOMATED COUNT: 14 % (ref 11.5–14.5)
GLOBULIN SER-MCNC: 3.4 G/DL (ref 2–4)
GLUCOSE SERPL-MCNC: 86 MG/DL (ref 74–106)
GLUCOSE UR QL STRIP: NEGATIVE
HCT VFR BLD AUTO: 29.4 % (ref 38–47)
HGB BLD-MCNC: 9.1 G/DL (ref 12–16)
IMM GRANULOCYTES # BLD AUTO: 0.36 K/UL (ref 0–0.04)
IMM GRANULOCYTES NFR BLD: 2.2 % (ref 0–0.4)
KETONES UR QL STRIP: NEGATIVE
LEUKOCYTE ESTERASE URINE, POC: NEGATIVE
LIPASE SERPL-CCNC: 61 U/L (ref 16–77)
LYMPHOCYTES # BLD AUTO: 1.64 K/UL (ref 1–4.8)
LYMPHOCYTES NFR BLD AUTO: 9.9 % (ref 27–41)
MCH RBC QN AUTO: 27.3 PG (ref 27–31)
MCHC RBC AUTO-ENTMCNC: 31 G/DL (ref 32–36)
MCV RBC AUTO: 88.3 FL (ref 80–96)
MONOCYTES # BLD AUTO: 2.36 K/UL (ref 0–0.8)
MONOCYTES NFR BLD AUTO: 14.2 % (ref 2–6)
MPC BLD CALC-MCNC: 10.6 FL (ref 9.4–12.4)
NEUTROPHILS # BLD AUTO: 11.93 K/UL (ref 1.8–7.7)
NEUTROPHILS NFR BLD AUTO: 71.7 % (ref 53–65)
NITRITE, POC UA: NEGATIVE
NRBC # BLD AUTO: 0 X10E3/UL
NRBC, AUTO (.00): 0 %
PH, POC UA: 7
PLATELET # BLD AUTO: 252 K/UL (ref 150–400)
POTASSIUM SERPL-SCNC: 4.1 MMOL/L (ref 3.5–5.1)
PROT SERPL-MCNC: 6.2 G/DL (ref 6.4–8.2)
PROTEIN, POC: NEGATIVE
RBC # BLD AUTO: 3.33 M/UL (ref 4.2–5.4)
SODIUM SERPL-SCNC: 138 MMOL/L (ref 136–145)
SPECIFIC GRAVITY, POC UA: 1.02
UROBILINOGEN, POC UA: 0.2
WBC # BLD AUTO: 16.61 K/UL (ref 4.5–11)

## 2024-10-23 PROCEDURE — 76819 FETAL BIOPHYS PROFIL W/O NST: CPT | Mod: ,,, | Performed by: OBSTETRICS & GYNECOLOGY

## 2024-10-23 PROCEDURE — 85025 COMPLETE CBC W/AUTO DIFF WBC: CPT | Mod: ,,, | Performed by: CLINICAL MEDICAL LABORATORY

## 2024-10-23 PROCEDURE — 36415 COLL VENOUS BLD VENIPUNCTURE: CPT | Mod: 90,,, | Performed by: ADVANCED PRACTICE MIDWIFE

## 2024-10-23 PROCEDURE — 99499 UNLISTED E&M SERVICE: CPT | Mod: ,,, | Performed by: OBSTETRICS & GYNECOLOGY

## 2024-10-23 PROCEDURE — 83690 ASSAY OF LIPASE: CPT | Mod: ,,, | Performed by: CLINICAL MEDICAL LABORATORY

## 2024-10-23 PROCEDURE — 76805 OB US >/= 14 WKS SNGL FETUS: CPT | Mod: ,,, | Performed by: OBSTETRICS & GYNECOLOGY

## 2024-10-23 PROCEDURE — 0502F SUBSEQUENT PRENATAL CARE: CPT | Mod: ,,, | Performed by: ADVANCED PRACTICE MIDWIFE

## 2024-10-23 PROCEDURE — 82150 ASSAY OF AMYLASE: CPT | Mod: ,,, | Performed by: CLINICAL MEDICAL LABORATORY

## 2024-10-23 PROCEDURE — 80053 COMPREHEN METABOLIC PANEL: CPT | Mod: ,,, | Performed by: CLINICAL MEDICAL LABORATORY

## 2024-10-23 NOTE — PROGRESS NOTES
"24 y.o. female  at 33w3d   She c/o swelling to feet due to adding extra salt to food. States she has been monitoring her blood pressure at homeand noticed that her blood pressure goes up and down. She denies any issues or problems today with her blood pressure. She did indicate that she has had some nausea. She admits to eating a "quarter pounder at [employer] that had a recall on the burger " and she ate one.   Reports good fetal movement or fluttering. Denies any vaginal bleeding, leakage of fluid, cramping, contractions, or pressure.   Total weight gain/weight loss in pregnancy: 19.8 kg (43 lb 9.6 oz)     Vitals  BP: 107/73  Pulse: 103  Weight: 81 kg (178 lb 9.6 oz)  Prenatal  Fundal Height (cm): 33 cm  Fetal Heart Rate: 135  Movement: Present  Urine Albumin/Glucose  Urine Albumin: Negative  Urine Glucose: Negative  Edema  LLE Edema: Mild pitting, slight indentation  RLE Edema: Mild pitting, slight indentation  Facial: None  Additional Edema?: No  No right upper quadrant tenderness noted today with exam.     Prenatal Labs:  Lab Results   Component Value Date    GROUPTRH O POS 2024    HGB 9.7 (L) 2024    HCT 30.9 (L) 2024     2024    SICKLE Negative 2024    HEPBSAG Non-Reactive 2024    [id number] Non-Reactive 2024    LABNGO Negative 2024    LABURIN Skin/Urogenital Olamide Isolated, no further workup. 2024       A: 33w3d           ICD-10-CM ICD-9-CM    1. 33 weeks gestation of pregnancy  Z3A.33 V22.2 POCT URINALYSIS      2. Nausea  R11.0 787.02       3. Right upper quadrant abdominal pain  R10.11 789.01 CBC Auto Differential      Amylase      Lipase      Comprehensive Metabolic Panel          P: Bleeding, daily fetal kick counts, and  labor/labor precautions discussed.    No pregnancy checklist tasks were completed during this visit, and no tasks are pending completion.  Questions answered to desired level of satisfaction  Verbalized understanding " to all information and instructions provided.  Follow up in about 2 weeks (around 11/6/2024), or if symptoms worsen or fail to improve, for CALVIN Reid DNP, CNM, WHNP-BC

## 2024-11-06 ENCOUNTER — ROUTINE PRENATAL (OUTPATIENT)
Dept: OBSTETRICS AND GYNECOLOGY | Facility: CLINIC | Age: 24
End: 2024-11-06
Payer: MEDICAID

## 2024-11-06 VITALS
SYSTOLIC BLOOD PRESSURE: 99 MMHG | WEIGHT: 180 LBS | BODY MASS INDEX: 28.19 KG/M2 | HEART RATE: 103 BPM | DIASTOLIC BLOOD PRESSURE: 64 MMHG

## 2024-11-06 DIAGNOSIS — Z72.51 HIGH RISK HETEROSEXUAL BEHAVIOR: ICD-10-CM

## 2024-11-06 DIAGNOSIS — Z3A.35 35 WEEKS GESTATION OF PREGNANCY: Primary | ICD-10-CM

## 2024-11-06 DIAGNOSIS — Z11.3 SCREEN FOR SEXUALLY TRANSMITTED DISEASES: ICD-10-CM

## 2024-11-06 LAB
AMPHET UR QL SCN: NEGATIVE
BARBITURATES UR QL SCN: NEGATIVE
BASOPHILS # BLD AUTO: 0.04 K/UL (ref 0–0.2)
BASOPHILS NFR BLD AUTO: 0.3 % (ref 0–1)
BENZODIAZ METAB UR QL SCN: NEGATIVE
BILIRUB SERPL-MCNC: NEGATIVE MG/DL
BLOOD, POC UA: NEGATIVE
CANDIDA SPECIES: NEGATIVE
CANNABINOIDS UR QL SCN: NEGATIVE
CHLAMYDIA BY PCR: NEGATIVE
COCAINE UR QL SCN: NEGATIVE
DIFFERENTIAL METHOD BLD: ABNORMAL
EOSINOPHIL # BLD AUTO: 0.19 K/UL (ref 0–0.5)
EOSINOPHIL NFR BLD AUTO: 1.5 % (ref 1–4)
ERYTHROCYTE [DISTWIDTH] IN BLOOD BY AUTOMATED COUNT: 14 % (ref 11.5–14.5)
GARDNERELLA: NEGATIVE
GLUCOSE UR QL STRIP: NEGATIVE
HCT VFR BLD AUTO: 29.7 % (ref 38–47)
HGB BLD-MCNC: 9.1 G/DL (ref 12–16)
IMM GRANULOCYTES # BLD AUTO: 0.33 K/UL (ref 0–0.04)
IMM GRANULOCYTES NFR BLD: 2.6 % (ref 0–0.4)
KETONES UR QL STRIP: NEGATIVE
LEUKOCYTE ESTERASE URINE, POC: NEGATIVE
LYMPHOCYTES # BLD AUTO: 1.79 K/UL (ref 1–4.8)
LYMPHOCYTES NFR BLD AUTO: 13.9 % (ref 27–41)
MCH RBC QN AUTO: 27.3 PG (ref 27–31)
MCHC RBC AUTO-ENTMCNC: 30.6 G/DL (ref 32–36)
MCV RBC AUTO: 89.2 FL (ref 80–96)
MONOCYTES # BLD AUTO: 1.15 K/UL (ref 0–0.8)
MONOCYTES NFR BLD AUTO: 9 % (ref 2–6)
MPC BLD CALC-MCNC: 10.2 FL (ref 9.4–12.4)
N. GONORRHOEAE (GC) BY PCR: NEGATIVE
NEUTROPHILS # BLD AUTO: 9.34 K/UL (ref 1.8–7.7)
NEUTROPHILS NFR BLD AUTO: 72.7 % (ref 53–65)
NITRITE, POC UA: NEGATIVE
NRBC # BLD AUTO: 0 X10E3/UL
NRBC, AUTO (.00): 0 %
OPIATES UR QL SCN: NEGATIVE
PCP UR QL SCN: NEGATIVE
PH, POC UA: 7.5
PLATELET # BLD AUTO: 331 K/UL (ref 150–400)
PROTEIN, POC: NEGATIVE
RBC # BLD AUTO: 3.33 M/UL (ref 4.2–5.4)
SPECIFIC GRAVITY, POC UA: 1.02
SYPHILIS AB INTERPRETATION: NORMAL
TRICHOMONAS: NEGATIVE
UROBILINOGEN, POC UA: 0.2
WBC # BLD AUTO: 12.84 K/UL (ref 4.5–11)

## 2024-11-06 PROCEDURE — 85025 COMPLETE CBC W/AUTO DIFF WBC: CPT | Mod: ,,, | Performed by: CLINICAL MEDICAL LABORATORY

## 2024-11-06 PROCEDURE — 87660 TRICHOMONAS VAGIN DIR PROBE: CPT | Mod: ,,, | Performed by: CLINICAL MEDICAL LABORATORY

## 2024-11-06 PROCEDURE — 87491 CHLMYD TRACH DNA AMP PROBE: CPT | Mod: ,,, | Performed by: CLINICAL MEDICAL LABORATORY

## 2024-11-06 PROCEDURE — 87480 CANDIDA DNA DIR PROBE: CPT | Mod: ,,, | Performed by: CLINICAL MEDICAL LABORATORY

## 2024-11-06 PROCEDURE — 87591 N.GONORRHOEAE DNA AMP PROB: CPT | Mod: ,,, | Performed by: CLINICAL MEDICAL LABORATORY

## 2024-11-06 PROCEDURE — 87653 STREP B DNA AMP PROBE: CPT | Mod: ,,, | Performed by: CLINICAL MEDICAL LABORATORY

## 2024-11-06 PROCEDURE — 80307 DRUG TEST PRSMV CHEM ANLYZR: CPT | Mod: ,,, | Performed by: CLINICAL MEDICAL LABORATORY

## 2024-11-06 PROCEDURE — 86780 TREPONEMA PALLIDUM: CPT | Mod: ,,, | Performed by: CLINICAL MEDICAL LABORATORY

## 2024-11-06 PROCEDURE — 36415 COLL VENOUS BLD VENIPUNCTURE: CPT | Mod: 90,,, | Performed by: ADVANCED PRACTICE MIDWIFE

## 2024-11-06 PROCEDURE — 87510 GARDNER VAG DNA DIR PROBE: CPT | Mod: ,,, | Performed by: CLINICAL MEDICAL LABORATORY

## 2024-11-06 PROCEDURE — 0502F SUBSEQUENT PRENATAL CARE: CPT | Mod: ,,, | Performed by: ADVANCED PRACTICE MIDWIFE

## 2024-11-06 NOTE — PROGRESS NOTES
24 y.o. female  at 35w3d   She c/o no problems  Reports good fetal movement or fluttering. Denies any vaginal bleeding, leakage of fluid, cramping, contractions, or pressure.   Total weight gain/weight loss in pregnancy: 20.4 kg (45 lb)     Vitals  BP: 99/64  Pulse: 103  Weight: 81.6 kg (180 lb)  Prenatal  Fundal Height (cm): 36 cm  Fetal Heart Rate: 159  Movement: Present  Urine Albumin/Glucose  Urine Albumin: Negative  Urine Glucose: Negative  Edema  LLE Edema: None  RLE Edema: None  Facial: None  Additional Edema?: No  Cervical Exam  Dilation: Closed  Effacement (%): 0  Station: -3  Presentation: Vertex  Station (Labor Curve): 8 cm  Dilation/Effacement/Station  Dilation: Closed  Effacement (%): 0  Station: -3    Prenatal Labs:  Lab Results   Component Value Date    GROUPTRH O POS 2024    HGB 9.1 (L) 10/23/2024    HCT 29.4 (L) 10/23/2024     10/23/2024    SICKLE Negative 2024    HEPBSAG Non-Reactive 2024    [id number] Non-Reactive 2024    LABNGO Negative 2024    LABURIN Skin/Urogenital Olamide Isolated, no further workup. 2024       A: 35w3d           ICD-10-CM ICD-9-CM    1. 35 weeks gestation of pregnancy  Z3A.35 V22.2 CBC Auto Differential      Drug Screen Definitive 14, Urine      POCT Urinalysis      CBC Auto Differential      2. Screen for sexually transmitted diseases  Z11.3 V74.5 Strep B Screen, Antepartum      Chlamydia/GC, PCR      Syphilis Antibody with reflex to RPR      Bacterial Vaginosis      Syphilis Antibody with reflex to RPR      3. High risk heterosexual behavior  Z72.51 V69.2 Strep B Screen, Antepartum      Chlamydia/GC, PCR      Syphilis Antibody with reflex to RPR      Bacterial Vaginosis      Syphilis Antibody with reflex to RPR          P: Bleeding, daily fetal kick counts, and  labor/labor precautions discussed.    The following were addressed during this visit:    33-36 Weeks  - Childbirth Education/Hospital Tours   - Breastfeeding   -  Group B Strep Test/HIV/RPR   - Fetal Kick Counts/PIH/PTL precautions       Questions answered to desired level of satisfaction  Verbalized understanding to all information and instructions provided.  Follow up in about 1 week (around 11/13/2024), or if symptoms worsen or fail to improve, for CALVIN Reid, MARLENE, CNM, WHNP-BC

## 2024-11-07 LAB — GROUP B STREP, PCR: NEGATIVE

## 2024-11-13 ENCOUNTER — ROUTINE PRENATAL (OUTPATIENT)
Dept: OBSTETRICS AND GYNECOLOGY | Facility: CLINIC | Age: 24
End: 2024-11-13
Payer: MEDICAID

## 2024-11-13 VITALS
SYSTOLIC BLOOD PRESSURE: 105 MMHG | DIASTOLIC BLOOD PRESSURE: 72 MMHG | HEART RATE: 105 BPM | BODY MASS INDEX: 27.94 KG/M2 | WEIGHT: 178.38 LBS

## 2024-11-13 DIAGNOSIS — Z3A.36 36 WEEKS GESTATION OF PREGNANCY: Primary | ICD-10-CM

## 2024-11-13 LAB
BILIRUB SERPL-MCNC: NORMAL MG/DL
BLOOD, POC UA: NORMAL
GLUCOSE UR QL STRIP: NORMAL
KETONES UR QL STRIP: NORMAL
LEUKOCYTE ESTERASE URINE, POC: NORMAL
NITRITE, POC UA: NORMAL
PH, POC UA: 7
PROTEIN, POC: NORMAL
SPECIFIC GRAVITY, POC UA: 1.01
UROBILINOGEN, POC UA: 0.2

## 2024-11-13 PROCEDURE — 0502F SUBSEQUENT PRENATAL CARE: CPT | Mod: ,,, | Performed by: ADVANCED PRACTICE MIDWIFE

## 2024-11-13 NOTE — PROGRESS NOTES
"24 y.o. female  at 36w3d   She c/o "sinus problems today"- zyrtec, clartin as needed OTC  Reports good fetal movement or fluttering. Denies any vaginal bleeding, leakage of fluid, cramping, contractions, or pressure.   Total weight gain/weight loss in pregnancy: 19.7 kg (43 lb 6.4 oz)     Vitals  BP: 105/72  Pulse: 105  Weight: 80.9 kg (178 lb 6.4 oz)  Prenatal  Fundal Height (cm): 37 cm  Fetal Heart Rate: 147  Movement: Present  Urine Albumin/Glucose  Urine Albumin: Negative  Urine Glucose: Negative  Edema  LLE Edema: None  RLE Edema: None  Facial: None  Additional Edema?: No    Prenatal Labs:  Lab Results   Component Value Date    GROUPTRH O POS 2024    HGB 9.1 (L) 2024    HCT 29.7 (L) 2024     2024    SICKLE Negative 2024    HEPBSAG Non-Reactive 2024    [id number] Non-Reactive 2024    LABNGO Negative 2024    LABURIN Skin/Urogenital Olamide Isolated, no further workup. 2024       A: 36w3d           ICD-10-CM ICD-9-CM    1. 36 weeks gestation of pregnancy  Z3A.36 V22.2 POCT Urinalysis          P: Bleeding, daily fetal kick counts, and  labor/labor precautions discussed.  No pregnancy checklist tasks were completed during this visit, and no tasks are pending completion.  Questions answered to desired level of satisfaction  Verbalized understanding to all information and instructions provided.  Follow up in about 1 week (around 2024), or if symptoms worsen or fail to improve.    Mariia Reid, DNP, CNM, WHNP-BC                "

## 2024-11-18 RX ORDER — FERROUS SULFATE 325(65) MG
325 TABLET, DELAYED RELEASE (ENTERIC COATED) ORAL 2 TIMES DAILY
Qty: 60 TABLET | Refills: 4 | Status: SHIPPED | OUTPATIENT
Start: 2024-11-18

## 2024-12-05 ENCOUNTER — TELEPHONE (OUTPATIENT)
Dept: OBSTETRICS AND GYNECOLOGY | Facility: CLINIC | Age: 24
End: 2024-12-05
Payer: MEDICAID

## 2024-12-05 ENCOUNTER — ROUTINE PRENATAL (OUTPATIENT)
Dept: OBSTETRICS AND GYNECOLOGY | Facility: CLINIC | Age: 24
End: 2024-12-05
Payer: MEDICAID

## 2024-12-05 VITALS
DIASTOLIC BLOOD PRESSURE: 78 MMHG | BODY MASS INDEX: 29.44 KG/M2 | SYSTOLIC BLOOD PRESSURE: 112 MMHG | HEART RATE: 118 BPM | WEIGHT: 188 LBS

## 2024-12-05 DIAGNOSIS — Z3A.39 39 WEEKS GESTATION OF PREGNANCY: Primary | ICD-10-CM

## 2024-12-05 LAB
BILIRUB SERPL-MCNC: NEGATIVE MG/DL
BLOOD, POC UA: NEGATIVE
GLUCOSE UR QL STRIP: NEGATIVE
KETONES UR QL STRIP: NEGATIVE
LEUKOCYTE ESTERASE URINE, POC: NEGATIVE
NITRITE, POC UA: NEGATIVE
PH, POC UA: 7.5
PROTEIN, POC: NEGATIVE
SPECIFIC GRAVITY, POC UA: 1.02
UROBILINOGEN, POC UA: 0.2

## 2024-12-05 NOTE — TELEPHONE ENCOUNTER
Patient was in the clinic this morning for her appointment.    ----- Message from Tech Laturina sent at 11/22/2024  9:46 AM CST -----  Who Called: [place]    Caller is requesting a sooner appointment. Caller declined first available appointment listed below. Caller will not accept being placed on the waitlist and is requesting a message be sent to doctor.    When is the first available appointment?12/05        Preferred Method of Contact: Phone Call  Patient's Preferred Phone Number on File: [phone]   Best Call Back Number, if different:  Additional Information: patient has her baby on Dec 8th and said that she gets seen every week, the first available appointment was Dec 5th but pt would like to be seen next week.

## 2024-12-05 NOTE — PROGRESS NOTES
24 y.o. female  at 39w4d   She c/o some pressure  Reports good fetal movement or fluttering. Denies any vaginal bleeding, leakage of fluid, cramping, contractions, or pressure.   Total weight gain/weight loss in pregnancy: 24 kg (53 lb)     Vitals  BP: 112/78  Pulse: (!) 118  Weight: 85.3 kg (188 lb)  Prenatal  Fundal Height (cm): 39 cm  Fetal Heart Rate: 141  Movement: Present  Urine Albumin/Glucose  Urine Albumin: Negative  Urine Glucose: Negative  Edema  LLE Edema: None  RLE Edema: None  Facial: None  Additional Edema?: No  Cervical Exam  Dilation: 1  Effacement (%): 50  Station: -3  Presentation: Vertex  Station (Labor Curve): 8 cm  Dilation/Effacement/Station  Dilation: 1  Effacement (%): 50  Station: -3    Prenatal Labs:  Lab Results   Component Value Date    GROUPTRH O POS 2024    HGB 9.1 (L) 2024    HCT 29.7 (L) 2024     2024    SICKLE Negative 2024    HEPBSAG Non-Reactive 2024    [id number] Non-Reactive 2024    LABNGO Negative 2024    LABURIN Skin/Urogenital Olamide Isolated, no further workup. 2024       A: 39w4d           ICD-10-CM ICD-9-CM    1. 39 weeks gestation of pregnancy  Z3A.39 V22.2 POCT Urinalysis          P: Bleeding, daily fetal kick counts, and  labor/labor precautions discussed.    The following were addressed during this visit:    37-41 Weeks  - Postpartum Immunizations   - Hospital Stay Expectations   - When to go to the hospital   - Fetal kick counts/PIH/Bleeding/ROM/Labor precautions   - Labor induction policy   - Cervical ripening   - Breastfeeding review: benefits of breastfeeding, early skin to skin, 24/7 rooming in, exclusive breastfeeding for 6 months       Questions answered to desired level of satisfaction  Verbalized understanding to all information and instructions provided.  Follow up in about 1 week (around 2024), or if symptoms worsen or fail to improve, for SOPHIE, Ultrasound.    Mariia Reid, MARLENE, CNM,  WHNP-BC

## 2024-12-08 ENCOUNTER — HOSPITAL ENCOUNTER (EMERGENCY)
Facility: HOSPITAL | Age: 24
Discharge: HOME | End: 2024-12-08
Attending: OBSTETRICS & GYNECOLOGY
Payer: MEDICAID

## 2024-12-08 VITALS
SYSTOLIC BLOOD PRESSURE: 120 MMHG | HEIGHT: 67 IN | DIASTOLIC BLOOD PRESSURE: 73 MMHG | WEIGHT: 189.38 LBS | BODY MASS INDEX: 29.72 KG/M2 | HEART RATE: 85 BPM

## 2024-12-08 DIAGNOSIS — Z86.19 HISTORY OF HERPES GENITALIS: ICD-10-CM

## 2024-12-08 DIAGNOSIS — O47.1 FALSE LABOR AFTER 37 WEEKS OF GESTATION WITHOUT DELIVERY: Primary | ICD-10-CM

## 2024-12-08 DIAGNOSIS — Z3A.40 40 WEEKS GESTATION OF PREGNANCY: ICD-10-CM

## 2024-12-08 LAB
BACTERIA #/AREA URNS HPF: ABNORMAL /HPF
BILIRUB UR QL STRIP: NEGATIVE
CLARITY UR: ABNORMAL
COLOR UR: ABNORMAL
GLUCOSE UR STRIP-MCNC: NORMAL MG/DL
KETONES UR STRIP-SCNC: NEGATIVE MG/DL
LEUKOCYTE ESTERASE UR QL STRIP: ABNORMAL
MUCOUS, UA: ABNORMAL /LPF
NITRITE UR QL STRIP: NEGATIVE
PH UR STRIP: 7 PH UNITS
PROT UR QL STRIP: NEGATIVE
RBC # UR STRIP: ABNORMAL /UL
RBC #/AREA URNS HPF: 5 /HPF
SP GR UR STRIP: 1.01
SQUAMOUS #/AREA URNS LPF: ABNORMAL /HPF
UROBILINOGEN UR STRIP-ACNC: NORMAL MG/DL
WBC #/AREA URNS HPF: 5 /HPF

## 2024-12-08 PROCEDURE — 81003 URINALYSIS AUTO W/O SCOPE: CPT | Performed by: OBSTETRICS & GYNECOLOGY

## 2024-12-08 PROCEDURE — 99284 EMERGENCY DEPT VISIT MOD MDM: CPT | Mod: 25

## 2024-12-08 PROCEDURE — 99285 EMERGENCY DEPT VISIT HI MDM: CPT | Mod: 25

## 2024-12-08 PROCEDURE — 87086 URINE CULTURE/COLONY COUNT: CPT | Performed by: OBSTETRICS & GYNECOLOGY

## 2024-12-08 NOTE — ED PROVIDER NOTES
Encounter Date: 2024    PATI Physician: Brad Lowe   Primary OBGYN:Mariia Reid CNM    Admit Diagnosis/Chief Complaint: Abdominal Pain and Contractions  History     Chief Complaint   Patient presents with    Abdominal Pain     Patient is a 24-year-old  who presents at 40 weeks and 0 days with complaints of painful uterine contractions that began proximally 4 hours prior to her arrival at the OB ED. she denies headaches blurred vision nausea vomiting leakage of amniotic fluid or vaginal bleeding.  Patient states she has an appointment with YASMIN Candelario  tomorrow and had planned to request a primary  section for history of HSV, but has now decided to attempt a vaginal delivery instead.  Patient states that she was on Valtrex daily for suppression but has missed the last 2 days.  She denies having any prodromal symptoms or seen any lesions since her last outbreak in 2019.  A sterile speculum examination was performed in the OB ED and no lesions were noted.  Patient is cervical exam was 1 cm 50% -3 which is unchanged after 2 hours of observation and no different from her last office visit.        Obstetric HPI:  Patient reports irregular contractions, active fetal movement, absent vaginal bleeding , absent loss of fluid      Objective:     Vital Signs (Most Recent):  Pulse: 85 (24 0413)  BP: 120/73 (24 0413) Vital Signs (24h Range):  Pulse:  [85] 85  BP: (120)/(73) 120/73     Weight: 85.9 kg (189 lb 6.4 oz)  Body mass index is 29.66 kg/m².    FHT: 130  Cat 1 (reassuring)  TOCO:  Irregular contractions Q 6-10 minutes    No intake or output data in the 24 hours ending 24 0643    Cervical Exam: Per Nurse x 2  Dilation:  1  Effacement:  50%  Station: -3  Presentation: Vertex     Significant Labs:  Recent Lab Results         24  0435        Appearance, UA Turbid       Bacteria, UA Moderate       Bilirubin (UA) Negative       Color, UA Light-Yellow       Glucose, UA Normal        Ketones, UA Negative       Leukocyte Esterase, UA Trace       Mucous Occasional       NITRITE UA Negative       Blood, UA Moderate       pH, UA 7.0       Protein, UA Negative       RBC, UA 5       Spec Grav UA 1.008       Squamous Epithelial Cells, UA Few       UROBILINOGEN UA Normal       WBC, UA 5             Review of patient's allergies indicates:   Allergen Reactions    Benadryl allergy-sinus Hives     Past Medical History:   Diagnosis Date    History of irritable bowel syndrome      History reviewed. No pertinent surgical history.  Family History   Problem Relation Name Age of Onset    Ovarian cancer Maternal Grandmother      Diabetes Father      Hypertension Mother      Hypertension Sister       Social History     Tobacco Use    Smoking status: Former     Types: Cigarettes     Passive exposure: Never    Smokeless tobacco: Never   Substance Use Topics    Alcohol use: Not Currently    Drug use: Never     Review of Systems   Constitutional:  Negative for appetite change, chills, fatigue and fever.   Eyes:  Negative for visual disturbance.   Respiratory:  Negative for cough, chest tightness and shortness of breath.    Cardiovascular:  Negative for chest pain, palpitations and leg swelling.   Gastrointestinal:  Negative for abdominal distention, abdominal pain, blood in stool, constipation, diarrhea, nausea and vomiting.   Endocrine: Negative for cold intolerance, heat intolerance, polydipsia, polyphagia and polyuria.   Genitourinary:  Negative for difficulty urinating, dyspareunia, dysuria, flank pain, frequency, genital sores, pelvic pain, urgency, vaginal bleeding, vaginal discharge and vaginal pain.   Musculoskeletal:  Negative for arthralgias and back pain.   Skin: Negative.    Neurological:  Negative for dizziness and headaches.   Psychiatric/Behavioral:  Negative for agitation, behavioral problems, confusion and sleep disturbance.        Physical Exam     Initial Vitals [12/08/24 0413]   BP Pulse Resp Temp  SpO2   120/73 85 -- -- --      MAP       --         Physical Exam    Nursing note and vitals reviewed.  Constitutional: She appears well-developed and well-nourished. No distress.   HENT:   Head: Normocephalic and atraumatic.   Nose: Nose normal.   Eyes: EOM are normal. Pupils are equal, round, and reactive to light.   Neck:   Normal range of motion.  Cardiovascular:  Normal rate.           Pulmonary/Chest: No respiratory distress.   Abdominal: Abdomen is soft. There is no abdominal tenderness.   Genitourinary:    Vagina normal.      No vaginal discharge.     Musculoskeletal:         General: No edema.      Cervical back: Normal range of motion.     Neurological: She is alert and oriented to person, place, and time.   Skin: Skin is warm and dry.   Psychiatric: She has a normal mood and affect. Thought content normal.     Sterile speculum exam-no lesions noted    ED Course     Labs Reviewed   URINALYSIS - Abnormal       Result Value    Color, UA Light-Yellow      Clarity, UA Turbid      pH, UA 7.0      Leukocytes, UA Trace (*)     Nitrites, UA Negative      Protein, UA Negative      Glucose, UA Normal      Ketones, UA Negative      Urobilinogen, UA Normal      Bilirubin, UA Negative      Blood, UA Moderate (*)     Specific [place], UA 1.008     URINALYSIS, MICROSCOPIC - Abnormal    WBC, UA 5      RBC, UA 5 (*)     Bacteria, UA Moderate (*)     Squamous Epithelial Cells, UA Few (*)     Mucous Occasional (*)    CULTURE, URINE        Imaging Results              US OB Limited with Biophysical Profile (xpd) (In process)  Result time 24 06:27:59                Biophysical profile 8/8 ALLA 7.4 cervical length 2.0     Medical Decision Making  Amount and/or Complexity of Data Reviewed  Labs: ordered.  Radiology: ordered.       Medications - No data to display              ED Course as of 24 0643   Sun Dec 08, 2024   0611 24-year-old  presents at 40 weeks and 0 days with complaints of painful uterine  contractions.  She has a reactive nonstress test and is having irregular contractions 6-10 minutes apart.  Her cervical exam was 1 cm 50% -3 which  was unchanged from her office visit last week.  There is no cervical change upon reexamination 2 hours later.  She has a reactive category 1 stress nonstress test with a biophysical profile of 8 of 8 ALLA of 7.4 and cervical length of 2.0 cm.  Urinalysis was negative for nitrites ketones bilirubin moderate blood and trace of leukocyte esterase.  Patient has a history of HSV and denies feeling any prodromal symptoms or noticing any lesions.  She states her last outbreak was in 2019.  She has been on suppression with the exception of the past 2 days.  Patient states that she was counseled by her provider Mariia Reid CNM and wants to attempt a vaginal delivery.  A sterile speculum examination was performed and no lesions were noted.  Labor precautions were discussed.  Patient was discharged home in stable condition.  She has a follow-up appointment in the morning with her provider. [JA]      ED Course User Index  [JA] Brad Lowe MD                 Clinical Impression:   Final diagnoses:  [Z3A.40] 40 weeks gestation of pregnancy  [O47.1] False labor after 37 weeks of gestation without delivery (Primary)  [Z86.19] History of herpes genitalis        ED Disposition Condition    Discharge Stable          ED Prescriptions    None       Follow-up Information       Follow up With Specialties Details Why Contact Info    Mariia Reid CNM Obstetrics and Gynecology In 1 day  [address] [place] [id number]  [phone]      [facility] Emergency Dept (OB) Emergency Medicine  As needed, If symptoms worsen [address] [place] [phone]             Brad Lowe MD  12/08/24 3209

## 2024-12-08 NOTE — ED NOTES
Dr Otoole at bedside. Spec exam performed per dr otoole, no lesions noted at this time. SVE 1/50/-3. Order rec'd to discharge home.

## 2024-12-08 NOTE — ED NOTES
Discharged home, ambulated to ER entrance in stable condition. Labor precautions given, and pt instructed on when to return and to keep her appointment Monday with PCP. Voiced understanding.

## 2024-12-09 ENCOUNTER — ANESTHESIA (OUTPATIENT)
Dept: OBSTETRICS AND GYNECOLOGY | Facility: HOSPITAL | Age: 24
End: 2024-12-09
Payer: MEDICAID

## 2024-12-09 ENCOUNTER — ANESTHESIA EVENT (OUTPATIENT)
Dept: OBSTETRICS AND GYNECOLOGY | Facility: HOSPITAL | Age: 24
End: 2024-12-09
Payer: MEDICAID

## 2024-12-09 ENCOUNTER — HOSPITAL ENCOUNTER (INPATIENT)
Facility: HOSPITAL | Age: 24
LOS: 2 days | Discharge: HOME | End: 2024-12-11
Attending: OBSTETRICS & GYNECOLOGY | Admitting: OBSTETRICS & GYNECOLOGY
Payer: MEDICAID

## 2024-12-09 DIAGNOSIS — Z98.891 STATUS POST PRIMARY LOW TRANSVERSE CESAREAN SECTION: Primary | ICD-10-CM

## 2024-12-09 DIAGNOSIS — R10.2 PELVIC PAIN AFFECTING PREGNANCY IN THIRD TRIMESTER, ANTEPARTUM: ICD-10-CM

## 2024-12-09 DIAGNOSIS — O26.893 PELVIC PAIN AFFECTING PREGNANCY IN THIRD TRIMESTER, ANTEPARTUM: ICD-10-CM

## 2024-12-09 DIAGNOSIS — Z3A.40 40 WEEKS GESTATION OF PREGNANCY: ICD-10-CM

## 2024-12-09 LAB
ALBUMIN SERPL BCP-MCNC: 2.9 G/DL (ref 3.5–5)
ALBUMIN/GLOB SERPL: 0.9 {RATIO}
ALP SERPL-CCNC: 246 U/L (ref 40–150)
ALT SERPL W P-5'-P-CCNC: <7 U/L
ANION GAP SERPL CALCULATED.3IONS-SCNC: 14 MMOL/L (ref 7–16)
AST SERPL W P-5'-P-CCNC: 21 U/L (ref 5–34)
BASOPHILS # BLD AUTO: 0.02 K/UL (ref 0–0.2)
BASOPHILS NFR BLD AUTO: 0.1 % (ref 0–1)
BILIRUB SERPL-MCNC: 0.4 MG/DL
BILIRUB UR QL STRIP: NEGATIVE
BUN SERPL-MCNC: 5 MG/DL (ref 7–19)
BUN/CREAT SERPL: 7 (ref 6–20)
CALCIUM SERPL-MCNC: 8.7 MG/DL (ref 8.4–10.2)
CHLORIDE SERPL-SCNC: 109 MMOL/L (ref 98–107)
CLARITY UR: CLEAR
CO2 SERPL-SCNC: 16 MMOL/L (ref 22–29)
COLOR UR: COLORLESS
CREAT SERPL-MCNC: 0.7 MG/DL (ref 0.55–1.02)
DIFFERENTIAL METHOD BLD: ABNORMAL
EGFR (NO RACE VARIABLE) (RUSH/TITUS): 124 ML/MIN/1.73M2
EOSINOPHIL # BLD AUTO: 0.03 K/UL (ref 0–0.5)
EOSINOPHIL NFR BLD AUTO: 0.2 % (ref 1–4)
ERYTHROCYTE [DISTWIDTH] IN BLOOD BY AUTOMATED COUNT: 15.2 % (ref 11.5–14.5)
GLOBULIN SER-MCNC: 3.2 G/DL (ref 2–4)
GLUCOSE SERPL-MCNC: 113 MG/DL (ref 74–100)
GLUCOSE UR STRIP-MCNC: NORMAL MG/DL
HBV SURFACE AG SERPL QL IA: NORMAL
HCT VFR BLD AUTO: 30.9 % (ref 38–47)
HGB BLD-MCNC: 9.6 G/DL (ref 12–16)
HIV 1+O+2 AB SERPL QL: NORMAL
IMM GRANULOCYTES # BLD AUTO: 0.23 K/UL (ref 0–0.04)
IMM GRANULOCYTES NFR BLD: 1.6 % (ref 0–0.4)
INDIRECT COOMBS: NORMAL
KETONES UR STRIP-SCNC: NEGATIVE MG/DL
LEUKOCYTE ESTERASE UR QL STRIP: NEGATIVE
LYMPHOCYTES # BLD AUTO: 1.76 K/UL (ref 1–4.8)
LYMPHOCYTES NFR BLD AUTO: 11.9 % (ref 27–41)
MCH RBC QN AUTO: 27 PG (ref 27–31)
MCHC RBC AUTO-ENTMCNC: 31.1 G/DL (ref 32–36)
MCV RBC AUTO: 87 FL (ref 80–96)
MONOCYTES # BLD AUTO: 1.38 K/UL (ref 0–0.8)
MONOCYTES NFR BLD AUTO: 9.3 % (ref 2–6)
MPC BLD CALC-MCNC: 10.2 FL (ref 9.4–12.4)
NEUTROPHILS # BLD AUTO: 11.35 K/UL (ref 1.8–7.7)
NEUTROPHILS NFR BLD AUTO: 76.9 % (ref 53–65)
NITRITE UR QL STRIP: NEGATIVE
NRBC # BLD AUTO: 0 X10E3/UL
NRBC, AUTO (.00): 0 %
PH UR STRIP: 7 PH UNITS
PLATELET # BLD AUTO: 298 K/UL (ref 150–400)
POTASSIUM SERPL-SCNC: 3.7 MMOL/L (ref 3.5–5.1)
PROT SERPL-MCNC: 6.1 G/DL (ref 6.4–8.3)
PROT UR QL STRIP: NEGATIVE
RBC # BLD AUTO: 3.55 M/UL (ref 4.2–5.4)
RBC # UR STRIP: NEGATIVE /UL
RH BLD: NORMAL
SODIUM SERPL-SCNC: 135 MMOL/L (ref 136–145)
SP GR UR STRIP: 1.01
SPECIMEN OUTDATE: NORMAL
SYPHILIS AB INTERPRETATION: NORMAL
UROBILINOGEN UR STRIP-ACNC: NORMAL MG/DL
WBC # BLD AUTO: 14.77 K/UL (ref 4.5–11)

## 2024-12-09 PROCEDURE — 63600175 PHARM REV CODE 636 W HCPCS: Performed by: ANESTHESIOLOGY

## 2024-12-09 PROCEDURE — 59409 OBSTETRICAL CARE: CPT | Mod: ANES,,, | Performed by: ANESTHESIOLOGY

## 2024-12-09 PROCEDURE — 62326 NJX INTERLAMINAR LMBR/SAC: CPT | Mod: AA | Performed by: ANESTHESIOLOGY

## 2024-12-09 PROCEDURE — 37000008 HC ANESTHESIA 1ST 15 MINUTES: Performed by: OBSTETRICS & GYNECOLOGY

## 2024-12-09 PROCEDURE — 59409 OBSTETRICAL CARE: CPT | Mod: CRNA,,, | Performed by: NURSE ANESTHETIST, CERTIFIED REGISTERED

## 2024-12-09 PROCEDURE — 87340 HEPATITIS B SURFACE AG IA: CPT | Performed by: OBSTETRICS & GYNECOLOGY

## 2024-12-09 PROCEDURE — 63600175 PHARM REV CODE 636 W HCPCS: Performed by: OBSTETRICS & GYNECOLOGY

## 2024-12-09 PROCEDURE — 85025 COMPLETE CBC W/AUTO DIFF WBC: CPT | Performed by: OBSTETRICS & GYNECOLOGY

## 2024-12-09 PROCEDURE — 11000001 HC ACUTE MED/SURG PRIVATE ROOM

## 2024-12-09 PROCEDURE — 71000039 HC RECOVERY, EACH ADD'L HOUR: Performed by: OBSTETRICS & GYNECOLOGY

## 2024-12-09 PROCEDURE — 59510 CESAREAN DELIVERY: CPT | Mod: U9,,, | Performed by: OBSTETRICS & GYNECOLOGY

## 2024-12-09 PROCEDURE — 27000165 HC TUBE, ETT CUFFED: Performed by: ANESTHESIOLOGY

## 2024-12-09 PROCEDURE — 27000510 HC BLANKET BAIR HUGGER ANY SIZE: Performed by: ANESTHESIOLOGY

## 2024-12-09 PROCEDURE — 80053 COMPREHEN METABOLIC PANEL: CPT | Performed by: OBSTETRICS & GYNECOLOGY

## 2024-12-09 PROCEDURE — 81003 URINALYSIS AUTO W/O SCOPE: CPT | Performed by: OBSTETRICS & GYNECOLOGY

## 2024-12-09 PROCEDURE — 71000033 HC RECOVERY, INTIAL HOUR: Performed by: OBSTETRICS & GYNECOLOGY

## 2024-12-09 PROCEDURE — 36004725 HC OB OR TIME LEV III - EA ADD 15 MIN: Performed by: OBSTETRICS & GYNECOLOGY

## 2024-12-09 PROCEDURE — 86850 RBC ANTIBODY SCREEN: CPT | Performed by: OBSTETRICS & GYNECOLOGY

## 2024-12-09 PROCEDURE — 27000655: Performed by: ANESTHESIOLOGY

## 2024-12-09 PROCEDURE — 27000716 HC OXISENSOR PROBE, ANY SIZE: Performed by: ANESTHESIOLOGY

## 2024-12-09 PROCEDURE — 87389 HIV-1 AG W/HIV-1&-2 AB AG IA: CPT | Performed by: OBSTETRICS & GYNECOLOGY

## 2024-12-09 PROCEDURE — 25000003 PHARM REV CODE 250: Performed by: ANESTHESIOLOGY

## 2024-12-09 PROCEDURE — 01968 ANES/ANALG CS DLVR NEURAXIAL: CPT | Mod: CRNA,,, | Performed by: NURSE ANESTHETIST, CERTIFIED REGISTERED

## 2024-12-09 PROCEDURE — 63600175 PHARM REV CODE 636 W HCPCS: Performed by: NURSE ANESTHETIST, CERTIFIED REGISTERED

## 2024-12-09 PROCEDURE — 36415 COLL VENOUS BLD VENIPUNCTURE: CPT | Performed by: OBSTETRICS & GYNECOLOGY

## 2024-12-09 PROCEDURE — 27201423 OPTIME MED/SURG SUP & DEVICES STERILE SUPPLY: Performed by: OBSTETRICS & GYNECOLOGY

## 2024-12-09 PROCEDURE — 25000003 PHARM REV CODE 250: Performed by: OBSTETRICS & GYNECOLOGY

## 2024-12-09 PROCEDURE — C1751 CATH, INF, PER/CENT/MIDLINE: HCPCS | Performed by: ANESTHESIOLOGY

## 2024-12-09 PROCEDURE — 36004724 HC OB OR TIME LEV III - 1ST 15 MIN: Performed by: OBSTETRICS & GYNECOLOGY

## 2024-12-09 PROCEDURE — 01968 ANES/ANALG CS DLVR NEURAXIAL: CPT | Mod: ANES,,, | Performed by: ANESTHESIOLOGY

## 2024-12-09 PROCEDURE — 51702 INSERT TEMP BLADDER CATH: CPT

## 2024-12-09 PROCEDURE — 27000689 HC BLADE LARYNGOSCOPE ANY SIZE: Performed by: ANESTHESIOLOGY

## 2024-12-09 PROCEDURE — 37000009 HC ANESTHESIA EA ADD 15 MINS: Performed by: OBSTETRICS & GYNECOLOGY

## 2024-12-09 PROCEDURE — 63600175 PHARM REV CODE 636 W HCPCS: Performed by: ADVANCED PRACTICE MIDWIFE

## 2024-12-09 PROCEDURE — 86780 TREPONEMA PALLIDUM: CPT | Performed by: OBSTETRICS & GYNECOLOGY

## 2024-12-09 RX ORDER — OXYTOCIN/0.9 % SODIUM CHLORIDE 15/250 ML
95 PLASTIC BAG, INJECTION (ML) INTRAVENOUS ONCE AS NEEDED
Status: DISCONTINUED | OUTPATIENT
Start: 2024-12-09 | End: 2024-12-11 | Stop reason: HOSPADM

## 2024-12-09 RX ORDER — HYDROMORPHONE HYDROCHLORIDE 2 MG/ML
0.5 INJECTION, SOLUTION INTRAMUSCULAR; INTRAVENOUS; SUBCUTANEOUS EVERY 5 MIN PRN
OUTPATIENT
Start: 2024-12-09

## 2024-12-09 RX ORDER — LIDOCAINE HYDROCHLORIDE 10 MG/ML
10 INJECTION, SOLUTION INFILTRATION; PERINEURAL ONCE AS NEEDED
Status: DISCONTINUED | OUTPATIENT
Start: 2024-12-09 | End: 2024-12-09

## 2024-12-09 RX ORDER — MISOPROSTOL 200 UG/1
800 TABLET ORAL ONCE AS NEEDED
Status: DISCONTINUED | OUTPATIENT
Start: 2024-12-09 | End: 2024-12-11 | Stop reason: HOSPADM

## 2024-12-09 RX ORDER — SODIUM CHLORIDE 0.9 % (FLUSH) 0.9 %
10 SYRINGE (ML) INJECTION
Status: DISCONTINUED | OUTPATIENT
Start: 2024-12-09 | End: 2024-12-11 | Stop reason: HOSPADM

## 2024-12-09 RX ORDER — OXYTOCIN-SODIUM CHLORIDE 0.9% IV SOLN 30 UNIT/500ML 30-0.9/5 UT/ML-%
10 SOLUTION INTRAVENOUS ONCE AS NEEDED
Status: DISCONTINUED | OUTPATIENT
Start: 2024-12-09 | End: 2024-12-09

## 2024-12-09 RX ORDER — TRANEXAMIC ACID 10 MG/ML
1000 INJECTION, SOLUTION INTRAVENOUS EVERY 30 MIN PRN
Status: DISCONTINUED | OUTPATIENT
Start: 2024-12-09 | End: 2024-12-11 | Stop reason: HOSPADM

## 2024-12-09 RX ORDER — PROPOFOL 10 MG/ML
VIAL (ML) INTRAVENOUS
Status: DISCONTINUED | OUTPATIENT
Start: 2024-12-09 | End: 2024-12-09

## 2024-12-09 RX ORDER — MISOPROSTOL 200 UG/1
800 TABLET ORAL ONCE AS NEEDED
Status: DISCONTINUED | OUTPATIENT
Start: 2024-12-09 | End: 2024-12-09

## 2024-12-09 RX ORDER — DOCUSATE SODIUM 100 MG/1
200 CAPSULE, LIQUID FILLED ORAL 2 TIMES DAILY
Status: DISCONTINUED | OUTPATIENT
Start: 2024-12-09 | End: 2024-12-11 | Stop reason: HOSPADM

## 2024-12-09 RX ORDER — TRANEXAMIC ACID 10 MG/ML
1000 INJECTION, SOLUTION INTRAVENOUS EVERY 30 MIN PRN
Status: DISCONTINUED | OUTPATIENT
Start: 2024-12-09 | End: 2024-12-09

## 2024-12-09 RX ORDER — LIDOCAINE HYDROCHLORIDE 20 MG/ML
10 INJECTION, SOLUTION EPIDURAL; INFILTRATION; INTRACAUDAL; PERINEURAL EVERY 5 MIN PRN
Status: DISCONTINUED | OUTPATIENT
Start: 2024-12-09 | End: 2024-12-09

## 2024-12-09 RX ORDER — METOCLOPRAMIDE 10 MG/1
10 TABLET ORAL EVERY 6 HOURS PRN
Status: DISCONTINUED | OUTPATIENT
Start: 2024-12-09 | End: 2024-12-09

## 2024-12-09 RX ORDER — DIPHENOXYLATE HYDROCHLORIDE AND ATROPINE SULFATE 2.5; .025 MG/1; MG/1
2 TABLET ORAL EVERY 6 HOURS PRN
Status: DISCONTINUED | OUTPATIENT
Start: 2024-12-09 | End: 2024-12-11 | Stop reason: HOSPADM

## 2024-12-09 RX ORDER — BUTORPHANOL TARTRATE 2 MG/ML
2 INJECTION INTRAMUSCULAR; INTRAVENOUS
Status: DISCONTINUED | OUTPATIENT
Start: 2024-12-09 | End: 2024-12-09

## 2024-12-09 RX ORDER — OXYTOCIN/0.9 % SODIUM CHLORIDE 15/250 ML
95 PLASTIC BAG, INJECTION (ML) INTRAVENOUS CONTINUOUS PRN
Status: DISCONTINUED | OUTPATIENT
Start: 2024-12-09 | End: 2024-12-11 | Stop reason: HOSPADM

## 2024-12-09 RX ORDER — OXYTOCIN 10 [USP'U]/ML
10 INJECTION, SOLUTION INTRAMUSCULAR; INTRAVENOUS ONCE AS NEEDED
Status: DISCONTINUED | OUTPATIENT
Start: 2024-12-09 | End: 2024-12-11 | Stop reason: HOSPADM

## 2024-12-09 RX ORDER — OXYTOCIN/0.9 % SODIUM CHLORIDE 15/250 ML
95 PLASTIC BAG, INJECTION (ML) INTRAVENOUS ONCE AS NEEDED
Status: DISCONTINUED | OUTPATIENT
Start: 2024-12-09 | End: 2024-12-09

## 2024-12-09 RX ORDER — AMOXICILLIN 250 MG
1 CAPSULE ORAL NIGHTLY PRN
Status: DISCONTINUED | OUTPATIENT
Start: 2024-12-09 | End: 2024-12-11 | Stop reason: HOSPADM

## 2024-12-09 RX ORDER — CALCIUM CARBONATE 200(500)MG
500 TABLET,CHEWABLE ORAL 3 TIMES DAILY PRN
Status: DISCONTINUED | OUTPATIENT
Start: 2024-12-09 | End: 2024-12-09

## 2024-12-09 RX ORDER — DEXAMETHASONE SODIUM PHOSPHATE 4 MG/ML
INJECTION, SOLUTION INTRA-ARTICULAR; INTRALESIONAL; INTRAMUSCULAR; INTRAVENOUS; SOFT TISSUE
Status: DISCONTINUED | OUTPATIENT
Start: 2024-12-09 | End: 2024-12-09

## 2024-12-09 RX ORDER — SODIUM CHLORIDE 9 MG/ML
INJECTION, SOLUTION INTRAVENOUS
Status: DISCONTINUED | OUTPATIENT
Start: 2024-12-09 | End: 2024-12-09

## 2024-12-09 RX ORDER — DIPHENHYDRAMINE HCL 25 MG
25 CAPSULE ORAL EVERY 4 HOURS PRN
Status: DISCONTINUED | OUTPATIENT
Start: 2024-12-09 | End: 2024-12-11 | Stop reason: HOSPADM

## 2024-12-09 RX ORDER — ACETAMINOPHEN 325 MG/1
650 TABLET ORAL EVERY 6 HOURS PRN
Status: DISCONTINUED | OUTPATIENT
Start: 2024-12-09 | End: 2024-12-09

## 2024-12-09 RX ORDER — KETOROLAC TROMETHAMINE 30 MG/ML
30 INJECTION, SOLUTION INTRAMUSCULAR; INTRAVENOUS EVERY 8 HOURS
Status: COMPLETED | OUTPATIENT
Start: 2024-12-09 | End: 2024-12-10

## 2024-12-09 RX ORDER — OXYTOCIN-SODIUM CHLORIDE 0.9% IV SOLN 30 UNIT/500ML 30-0.9/5 UT/ML-%
30 SOLUTION INTRAVENOUS ONCE AS NEEDED
Status: DISCONTINUED | OUTPATIENT
Start: 2024-12-09 | End: 2024-12-09

## 2024-12-09 RX ORDER — BUTORPHANOL TARTRATE 2 MG/ML
1 INJECTION INTRAMUSCULAR; INTRAVENOUS
Status: DISCONTINUED | OUTPATIENT
Start: 2024-12-09 | End: 2024-12-09

## 2024-12-09 RX ORDER — MUPIROCIN 20 MG/G
OINTMENT TOPICAL
Status: DISCONTINUED | OUTPATIENT
Start: 2024-12-09 | End: 2024-12-09

## 2024-12-09 RX ORDER — OXYTOCIN/0.9 % SODIUM CHLORIDE 15/250 ML
0-32 PLASTIC BAG, INJECTION (ML) INTRAVENOUS CONTINUOUS
Status: DISCONTINUED | OUTPATIENT
Start: 2024-12-09 | End: 2024-12-09

## 2024-12-09 RX ORDER — NALOXONE HCL 0.4 MG/ML
0.02 VIAL (ML) INJECTION
Status: DISCONTINUED | OUTPATIENT
Start: 2024-12-09 | End: 2024-12-11 | Stop reason: HOSPADM

## 2024-12-09 RX ORDER — ONDANSETRON HYDROCHLORIDE 2 MG/ML
4 INJECTION, SOLUTION INTRAVENOUS EVERY 12 HOURS PRN
Status: DISCONTINUED | OUTPATIENT
Start: 2024-12-09 | End: 2024-12-11 | Stop reason: HOSPADM

## 2024-12-09 RX ORDER — MORPHINE SULFATE 1 MG/ML
INJECTION INTRAVENOUS CONTINUOUS
Status: DISCONTINUED | OUTPATIENT
Start: 2024-12-09 | End: 2024-12-10

## 2024-12-09 RX ORDER — SIMETHICONE 80 MG
1 TABLET,CHEWABLE ORAL EVERY 6 HOURS PRN
Status: DISCONTINUED | OUTPATIENT
Start: 2024-12-09 | End: 2024-12-11 | Stop reason: HOSPADM

## 2024-12-09 RX ORDER — ONDANSETRON 4 MG/1
8 TABLET, ORALLY DISINTEGRATING ORAL EVERY 8 HOURS PRN
Status: DISCONTINUED | OUTPATIENT
Start: 2024-12-09 | End: 2024-12-09

## 2024-12-09 RX ORDER — CARBOPROST TROMETHAMINE 250 UG/ML
250 INJECTION, SOLUTION INTRAMUSCULAR
Status: DISCONTINUED | OUTPATIENT
Start: 2024-12-09 | End: 2024-12-09

## 2024-12-09 RX ORDER — KETOROLAC TROMETHAMINE 30 MG/ML
INJECTION, SOLUTION INTRAMUSCULAR; INTRAVENOUS
Status: DISCONTINUED | OUTPATIENT
Start: 2024-12-09 | End: 2024-12-09

## 2024-12-09 RX ORDER — MEPERIDINE HYDROCHLORIDE 25 MG/ML
25 INJECTION INTRAMUSCULAR; INTRAVENOUS; SUBCUTANEOUS EVERY 10 MIN PRN
OUTPATIENT
Start: 2024-12-09 | End: 2024-12-09

## 2024-12-09 RX ORDER — METHYLERGONOVINE MALEATE 0.2 MG/ML
200 INJECTION INTRAVENOUS ONCE AS NEEDED
Status: DISCONTINUED | OUTPATIENT
Start: 2024-12-09 | End: 2024-12-09

## 2024-12-09 RX ORDER — OXYCODONE AND ACETAMINOPHEN 10; 325 MG/1; MG/1
1 TABLET ORAL EVERY 4 HOURS PRN
OUTPATIENT
Start: 2024-12-09

## 2024-12-09 RX ORDER — ONDANSETRON HYDROCHLORIDE 2 MG/ML
4 INJECTION, SOLUTION INTRAVENOUS EVERY 6 HOURS PRN
Status: DISCONTINUED | OUTPATIENT
Start: 2024-12-09 | End: 2024-12-09

## 2024-12-09 RX ORDER — VALACYCLOVIR HYDROCHLORIDE 500 MG/1
1000 TABLET, FILM COATED ORAL DAILY
Status: DISCONTINUED | OUTPATIENT
Start: 2024-12-09 | End: 2024-12-11 | Stop reason: HOSPADM

## 2024-12-09 RX ORDER — METHYLERGONOVINE MALEATE 0.2 MG/ML
200 INJECTION INTRAVENOUS ONCE AS NEEDED
Status: DISCONTINUED | OUTPATIENT
Start: 2024-12-09 | End: 2024-12-11 | Stop reason: HOSPADM

## 2024-12-09 RX ORDER — ROPIVACAINE HYDROCHLORIDE 5 MG/ML
15 INJECTION, SOLUTION EPIDURAL; INFILTRATION; PERINEURAL ONCE
Status: COMPLETED | OUTPATIENT
Start: 2024-12-09 | End: 2024-12-09

## 2024-12-09 RX ORDER — FAMOTIDINE 10 MG/ML
20 INJECTION INTRAVENOUS
Status: COMPLETED | OUTPATIENT
Start: 2024-12-09 | End: 2024-12-09

## 2024-12-09 RX ORDER — FENTANYL/ROPIVACAINE/NS/PF 2MCG/ML-.2
PLASTIC BAG, INJECTION (ML) INJECTION CONTINUOUS
Status: DISCONTINUED | OUTPATIENT
Start: 2024-12-09 | End: 2024-12-09

## 2024-12-09 RX ORDER — SUCCINYLCHOLINE CHLORIDE 20 MG/ML
INJECTION INTRAMUSCULAR; INTRAVENOUS
Status: DISCONTINUED | OUTPATIENT
Start: 2024-12-09 | End: 2024-12-09

## 2024-12-09 RX ORDER — MUPIROCIN 20 MG/G
OINTMENT TOPICAL 2 TIMES DAILY
Status: DISCONTINUED | OUTPATIENT
Start: 2024-12-09 | End: 2024-12-11 | Stop reason: HOSPADM

## 2024-12-09 RX ORDER — OXYCODONE AND ACETAMINOPHEN 5; 325 MG/1; MG/1
1 TABLET ORAL EVERY 4 HOURS PRN
OUTPATIENT
Start: 2024-12-09

## 2024-12-09 RX ORDER — DIPHENOXYLATE HYDROCHLORIDE AND ATROPINE SULFATE 2.5; .025 MG/1; MG/1
2 TABLET ORAL EVERY 6 HOURS PRN
Status: DISCONTINUED | OUTPATIENT
Start: 2024-12-09 | End: 2024-12-09

## 2024-12-09 RX ORDER — MORPHINE SULFATE 10 MG/ML
4 INJECTION INTRAMUSCULAR; INTRAVENOUS; SUBCUTANEOUS EVERY 5 MIN PRN
OUTPATIENT
Start: 2024-12-09

## 2024-12-09 RX ORDER — SODIUM CHLORIDE, SODIUM LACTATE, POTASSIUM CHLORIDE, CALCIUM CHLORIDE 600; 310; 30; 20 MG/100ML; MG/100ML; MG/100ML; MG/100ML
INJECTION, SOLUTION INTRAVENOUS CONTINUOUS
Status: DISCONTINUED | OUTPATIENT
Start: 2024-12-09 | End: 2024-12-09

## 2024-12-09 RX ORDER — PRENATAL WITH FERROUS FUM AND FOLIC ACID 3080; 920; 120; 400; 22; 1.84; 3; 20; 10; 1; 12; 200; 27; 25; 2 [IU]/1; [IU]/1; MG/1; [IU]/1; MG/1; MG/1; MG/1; MG/1; MG/1; MG/1; UG/1; MG/1; MG/1; MG/1; MG/1
1 TABLET ORAL DAILY
Status: DISCONTINUED | OUTPATIENT
Start: 2024-12-10 | End: 2024-12-11 | Stop reason: HOSPADM

## 2024-12-09 RX ORDER — MORPHINE SULFATE 8 MG/ML
INJECTION INTRAMUSCULAR; INTRAVENOUS; SUBCUTANEOUS
Status: DISCONTINUED | OUTPATIENT
Start: 2024-12-09 | End: 2024-12-09

## 2024-12-09 RX ORDER — ADHESIVE BANDAGE
30 BANDAGE TOPICAL 2 TIMES DAILY PRN
Status: DISCONTINUED | OUTPATIENT
Start: 2024-12-10 | End: 2024-12-11 | Stop reason: HOSPADM

## 2024-12-09 RX ORDER — OXYTOCIN/0.9 % SODIUM CHLORIDE 15/250 ML
95 PLASTIC BAG, INJECTION (ML) INTRAVENOUS CONTINUOUS PRN
Status: DISCONTINUED | OUTPATIENT
Start: 2024-12-09 | End: 2024-12-09

## 2024-12-09 RX ORDER — SODIUM CHLORIDE, SODIUM LACTATE, POTASSIUM CHLORIDE, CALCIUM CHLORIDE 600; 310; 30; 20 MG/100ML; MG/100ML; MG/100ML; MG/100ML
INJECTION, SOLUTION INTRAVENOUS CONTINUOUS
Status: DISCONTINUED | OUTPATIENT
Start: 2024-12-09 | End: 2024-12-10

## 2024-12-09 RX ORDER — BISACODYL 10 MG/1
10 SUPPOSITORY RECTAL ONCE AS NEEDED
Status: COMPLETED | OUTPATIENT
Start: 2024-12-09 | End: 2024-12-11

## 2024-12-09 RX ORDER — CEFAZOLIN 2 G/1
2 INJECTION, POWDER, FOR SOLUTION INTRAMUSCULAR; INTRAVENOUS ONCE AS NEEDED
Status: COMPLETED | OUTPATIENT
Start: 2024-12-09 | End: 2024-12-09

## 2024-12-09 RX ORDER — GLUCAGON 1 MG
1 KIT INJECTION
OUTPATIENT
Start: 2024-12-09

## 2024-12-09 RX ORDER — SIMETHICONE 80 MG
1 TABLET,CHEWABLE ORAL 4 TIMES DAILY PRN
Status: DISCONTINUED | OUTPATIENT
Start: 2024-12-09 | End: 2024-12-09

## 2024-12-09 RX ORDER — LIDOCAINE HYDROCHLORIDE 20 MG/ML
INJECTION, SOLUTION EPIDURAL; INFILTRATION; INTRACAUDAL; PERINEURAL
Status: COMPLETED | OUTPATIENT
Start: 2024-12-09 | End: 2024-12-09

## 2024-12-09 RX ORDER — ONDANSETRON HYDROCHLORIDE 2 MG/ML
4 INJECTION, SOLUTION INTRAVENOUS DAILY PRN
OUTPATIENT
Start: 2024-12-09

## 2024-12-09 RX ORDER — LIDOCAINE HYDROCHLORIDE 20 MG/ML
INJECTION, SOLUTION EPIDURAL; INFILTRATION; INTRACAUDAL; PERINEURAL
Status: DISCONTINUED | OUTPATIENT
Start: 2024-12-09 | End: 2024-12-09

## 2024-12-09 RX ORDER — OXYTOCIN 10 [USP'U]/ML
10 INJECTION, SOLUTION INTRAMUSCULAR; INTRAVENOUS ONCE AS NEEDED
Status: COMPLETED | OUTPATIENT
Start: 2024-12-09 | End: 2024-12-09

## 2024-12-09 RX ORDER — CARBOPROST TROMETHAMINE 250 UG/ML
250 INJECTION, SOLUTION INTRAMUSCULAR
Status: DISCONTINUED | OUTPATIENT
Start: 2024-12-09 | End: 2024-12-11 | Stop reason: HOSPADM

## 2024-12-09 RX ORDER — PROCHLORPERAZINE EDISYLATE 5 MG/ML
5 INJECTION INTRAMUSCULAR; INTRAVENOUS EVERY 6 HOURS PRN
Status: DISCONTINUED | OUTPATIENT
Start: 2024-12-09 | End: 2024-12-11 | Stop reason: HOSPADM

## 2024-12-09 RX ORDER — ONDANSETRON 4 MG/1
8 TABLET, ORALLY DISINTEGRATING ORAL EVERY 8 HOURS PRN
Status: DISCONTINUED | OUTPATIENT
Start: 2024-12-09 | End: 2024-12-11 | Stop reason: HOSPADM

## 2024-12-09 RX ADMIN — PROPOFOL 160 MG: 10 INJECTION, EMULSION INTRAVENOUS at 05:12

## 2024-12-09 RX ADMIN — AZITHROMYCIN MONOHYDRATE 500 MG: 500 INJECTION, POWDER, LYOPHILIZED, FOR SOLUTION INTRAVENOUS at 05:12

## 2024-12-09 RX ADMIN — MORPHINE SULFATE 8 MG: 8 INJECTION INTRAVENOUS at 05:12

## 2024-12-09 RX ADMIN — SODIUM CHLORIDE, POTASSIUM CHLORIDE, SODIUM LACTATE AND CALCIUM CHLORIDE: 600; 310; 30; 20 INJECTION, SOLUTION INTRAVENOUS at 01:12

## 2024-12-09 RX ADMIN — OXYTOCIN 2 MILLI-UNITS/MIN: 10 INJECTION, SOLUTION INTRAMUSCULAR; INTRAVENOUS at 06:12

## 2024-12-09 RX ADMIN — ONDANSETRON 4 MG: 2 INJECTION INTRAMUSCULAR; INTRAVENOUS at 05:12

## 2024-12-09 RX ADMIN — SUCCINYLCHOLINE CHLORIDE 120 MG: 20 INJECTION, SOLUTION INTRAMUSCULAR; INTRAVENOUS; PARENTERAL at 05:12

## 2024-12-09 RX ADMIN — LIDOCAINE HYDROCHLORIDE 75 MG: 20 INJECTION, SOLUTION EPIDURAL; INFILTRATION; INTRACAUDAL; PERINEURAL at 05:12

## 2024-12-09 RX ADMIN — BUTORPHANOL TARTRATE 2 MG: 2 INJECTION, SOLUTION INTRAMUSCULAR; INTRAVENOUS at 01:12

## 2024-12-09 RX ADMIN — LIDOCAINE HYDROCHLORIDE 10 ML: 20 INJECTION, SOLUTION INTRAVENOUS at 04:12

## 2024-12-09 RX ADMIN — KETOROLAC TROMETHAMINE 30 MG: 30 INJECTION, SOLUTION INTRAMUSCULAR at 09:12

## 2024-12-09 RX ADMIN — KETOROLAC TROMETHAMINE 30 MG: 30 INJECTION, SOLUTION INTRAMUSCULAR at 05:12

## 2024-12-09 RX ADMIN — ROPIVACAINE HYDROCHLORIDE 12 ML/HR: 10 INJECTION, SOLUTION EPIDURAL at 04:12

## 2024-12-09 RX ADMIN — SODIUM CHLORIDE: 9 INJECTION, SOLUTION INTRAVENOUS at 11:12

## 2024-12-09 RX ADMIN — ROPIVACAINE HYDROCHLORIDE 5 ML: 5 INJECTION, SOLUTION EPIDURAL; INFILTRATION; PERINEURAL at 01:12

## 2024-12-09 RX ADMIN — DEXAMETHASONE SODIUM PHOSPHATE 10 MG: 4 INJECTION, SOLUTION INTRA-ARTICULAR; INTRALESIONAL; INTRAMUSCULAR; INTRAVENOUS; SOFT TISSUE at 05:12

## 2024-12-09 RX ADMIN — OXYTOCIN 10 UNITS: 10 INJECTION, SOLUTION INTRAMUSCULAR; INTRAVENOUS at 05:12

## 2024-12-09 RX ADMIN — CEFAZOLIN 2 G: 2 INJECTION, POWDER, FOR SOLUTION INTRAMUSCULAR; INTRAVENOUS at 05:12

## 2024-12-09 RX ADMIN — FAMOTIDINE 20 MG: 10 INJECTION, SOLUTION INTRAVENOUS at 05:12

## 2024-12-09 RX ADMIN — DOCUSATE SODIUM 200 MG: 100 CAPSULE, LIQUID FILLED ORAL at 09:12

## 2024-12-09 RX ADMIN — ROPIVACAINE HYDROCHLORIDE 5 ML: 5 INJECTION, SOLUTION EPIDURAL; INFILTRATION; PERINEURAL at 03:12

## 2024-12-09 RX ADMIN — ROPIVACAINE HYDROCHLORIDE 500 MCG: 10 INJECTION, SOLUTION EPIDURAL at 04:12

## 2024-12-09 RX ADMIN — OXYTOCIN 95 MILLI-UNITS/MIN: 10 INJECTION INTRAVENOUS at 08:12

## 2024-12-09 RX ADMIN — LIDOCAINE HYDROCHLORIDE 200 MG: 20 INJECTION, SOLUTION INTRAVENOUS at 04:12

## 2024-12-09 RX ADMIN — SODIUM CHLORIDE, POTASSIUM CHLORIDE, SODIUM LACTATE AND CALCIUM CHLORIDE: 600; 310; 30; 20 INJECTION, SOLUTION INTRAVENOUS at 10:12

## 2024-12-09 RX ADMIN — SODIUM CHLORIDE, POTASSIUM CHLORIDE, SODIUM LACTATE AND CALCIUM CHLORIDE: 600; 310; 30; 20 INJECTION, SOLUTION INTRAVENOUS at 04:12

## 2024-12-09 RX ADMIN — VALACYCLOVIR HYDROCHLORIDE 1000 MG: 500 TABLET, FILM COATED ORAL at 08:12

## 2024-12-09 RX ADMIN — Medication: at 06:12

## 2024-12-09 NOTE — ANESTHESIA PROCEDURE NOTES
Intubation    Date/Time: 12/9/2024 5:33 PM    Performed by: Ayaan Orellana CRNA  Authorized by: Los Aggarwal MD    Intubation:     Induction:  Rapid sequence induction    Intubated:  Postinduction    Mask Ventilation:  Easy mask    Attempts:  1    Attempted By:  CRNA    Method of Intubation:  Direct    Blade:  [place] 3    Laryngeal View Grade: Grade I - full view of cords      Difficult Airway Encountered?: No      Complications:  None    Airway Device:  Oral endotracheal tube    Airway Device Size:  7.0    Style/Cuff Inflation:  Cuffed (inflated to minimal occlusive pressure)    Inflation Amount (mL):  7    Tube secured:  21    Placement Verified By:  Capnometry    Complicating Factors:  None    Findings Post-Intubation:  BS equal bilateral and atraumatic/condition of teeth unchanged

## 2024-12-09 NOTE — SUBJECTIVE & OBJECTIVE
Interval History:  Negro is a 24 y.o.  at 40w1d. She is comfortable with epidural in place and feels come contractions.     Objective:     Vital Signs (Most Recent):  Temp: 98.2 °F (36.8 °C) (24 1435)  Pulse: (!) 117 (24 1645)  Resp: 17 (24 0715)  BP: 114/74 (24 1630)  SpO2: 99 % (24 1645) Vital Signs (24h Range):  Temp:  [97.9 °F (36.6 °C)-98.2 °F (36.8 °C)] 98.2 °F (36.8 °C)  Pulse:  [] 117  Resp:  [17-18] 17  SpO2:  [97 %-100 %] 99 %  BP: ()/(53-88) 114/74     Weight: 85.5 kg (188 lb 9.6 oz)  Body mass index is 29.54 kg/m².    FHT: 150s Cat 1 (reassuring)  TOCO:  Q 2-3 minutes    Cervical Exam:  Dilation:  7  Effacement:  50%  Station: -3  Presentation: Vertex cervix edematous     Significant Labs:  Lab Results   Component Value Date    GROUPTRH O POS 2024    HEPBSAG Non-Reactive 2024       Recent Lab Results         24  0158   24  0045        Albumin/Globulin Ratio 0.9         Albumin 2.9                  ALT <7         Anion Gap 14         Appearance, UA   Clear       AST 21         Baso # 0.02         Basophil % 0.1         Bilirubin (UA)   Negative       BILIRUBIN TOTAL 0.4         BUN 5         BUN/CREAT RATIO 7         Calcium 8.7         Chloride 109         CO2 16         Color, UA   Colorless       Creatinine 0.70         Differential Method Auto         eGFR 124         Eos # 0.03         Eos % 0.2         Globulin, Total 3.2         Glucose 113         Glucose, UA   Normal       Group & Rh O POS         Hematocrit 30.9         Hemoglobin 9.6         Hepatitis B Surface Ag Non-Reactive         HIV 1/2 Ag/Ab Non-Reactive  Comment: Upon Admission. PRN third-trimester screen not lloyd         Immature Grans (Abs) 0.23         Immature Granulocytes 1.6         INDIRECT ABEL NEG         Ketones, UA   Negative       Leukocyte Esterase, UA   Negative       Lymph # 1.76         Lymph % 11.9         MCH 27.0         MCHC 31.1          MCV 87.0         Mono # 1.38         Mono % 9.3         MPV 10.2         Neutrophils, Abs 11.35         Neutrophils Relative 76.9         NITRITE UA   Negative       nRBC 0.0         NUCLEATED RBC ABSOLUTE 0.00         Blood, UA   Negative       pH, UA   7.0       Platelet Count 298         Potassium 3.7         PROTEIN TOTAL 6.1         Protein, UA   Negative       RBC 3.55         RDW 15.2         Sodium 135         Spec Grav UA   1.006       Specimen Outdate 12/12/2024 23:59         Syphilis Ab Interpretation Non-Reactive  Comment: Upon Admission. PRN third-trimester screen not lloyd  0.0 - 0.9: Non-Reactive  0.91 - 1.10: Equivocal with RPR to follow  >1.10:  Reactive with RPR to Follow         UROBILINOGEN UA   Normal       WBC 14.77                 Physical Exam:   Constitutional: She is oriented to person, place, and time. She appears well-developed and well-nourished.                       Musculoskeletal: Moves all extremeties.       Neurological: She is alert and oriented to person, place, and time.         Review of Systems   All other systems reviewed and are negative.

## 2024-12-09 NOTE — H&P
[facility] -  Labor and Delivery  Obstetrics  History & Physical    Patient Name: Negro Park  MRN: [id number]  Admission Date: 2024  Primary Care Provider: Diane, Primary Doctor    Subjective:     Principal Problem:Pelvic pain affecting pregnancy in third trimester, antepartum    History of Present Illness:       Expand All Collapse All    Encounter Date: 2024     PATI Physician: Christian Barrett   Primary OBGYN:Mariia Reid CNM     Admit Diagnosis/Chief Complaint: Contractions  History          Chief Complaint   Patient presents with    Contractions         25yo  at 40w1d seen around 0300  and found to have q6-10' contractions unchanged from office visit.  Returns with Ctx q2-3 min.     Patient nervous about Hx of HSV.  Dr Lowe did a speculum exam at last PATI visit  and found no lesions.  She did take her Valtrex yesterday but has missed some doses.  Has NO prodromal symptoms.          Obstetric HPI:  Patient reports increasing frequency and intensity of contractions, active fetal movement, No vaginal bleeding , No loss of fluid     This pregnancy has been complicated by Hx HSV (last outbreak ) on Valtrex     OB History    Para Term  AB Living   1 0 0 0 0 0   SAB IAB Ectopic Multiple Live Births   0 0 0 0 0      # Outcome Date GA Lbr Tyson/2nd Weight Sex Type Anes PTL Lv   1 Current              Past Medical History:   Diagnosis Date    History of irritable bowel syndrome      No past surgical history on file.    PTA Medications   Medication Sig    ferrous sulfate 325 (65 FE) MG EC tablet Take 1 tablet (325 mg total) by mouth 2 (two) times daily.    ondansetron (ZOFRAN-ODT) 8 MG TbDL Take 1 tablet (8 mg total) by mouth 3 (three) times daily.    PNV no.153/FA/om3/dha/epa/fish (PRENATAL GUMMIES ORAL) Take by mouth.    prenatal no118-iron-folic acid 29 mg iron- 1 mg Chew Take 1 tablet by mouth once daily. (Patient not taking: Reported on 10/9/2024)    valACYclovir  (VALTREX) 1000 MG tablet Take 1 tablet (1,000 mg total) by mouth once daily.       Review of patient's allergies indicates:   Allergen Reactions    Benadryl allergy-sinus Hives        Family History       Problem Relation (Age of Onset)    Diabetes Father    Hypertension Mother, Sister    Ovarian cancer Maternal Grandmother          Tobacco Use    Smoking status: Former     Types: Cigarettes     Passive exposure: Never    Smokeless tobacco: Never   Substance and Sexual Activity    Alcohol use: Not Currently    Drug use: Never    Sexual activity: Not Currently     Partners: Male     Review of Systems   Constitutional:  Negative for chills and fever.   Respiratory:  Negative for cough and shortness of breath.    Cardiovascular:  Negative for chest pain and leg swelling.   Gastrointestinal:  Positive for abdominal pain (contractions). Negative for blood in stool, constipation, diarrhea, nausea and vomiting.   Genitourinary:  Positive for pelvic pain (contractions). Negative for dysmenorrhea, dyspareunia, dysuria, flank pain, frequency, genital sores, hematuria, urgency, vaginal bleeding, vaginal discharge, vaginal pain and vaginal odor.   Musculoskeletal:  Negative for back pain.   Neurological:  Negative for syncope and headaches.   Psychiatric/Behavioral:  Negative for depression. The patient is not nervous/anxious.       Objective:     Vital Signs (Most Recent):  Temp: 98.2 °F (36.8 °C) (12/09/24 0010)  Pulse: 98 (12/09/24 0010)  Resp: 18 (12/09/24 0010)  BP: 122/80 (12/09/24 0010)  SpO2: 99 % (12/09/24 0010) Vital Signs (24h Range):  Temp:  [98.2 °F (36.8 °C)] 98.2 °F (36.8 °C)  Pulse:  [85-98] 98  Resp:  [18] 18  SpO2:  [99 %] 99 %  BP: (120-122)/(73-80) 122/80     Weight: 85.5 kg (188 lb 9.6 oz)  Body mass index is 29.54 kg/m².    FHT: 140 Cat 1 (reassuring)  TOCO:  Q 1-3 minutes     Physical Exam:   Constitutional: She is oriented to person, place, and time. She appears well-developed and well-nourished.     HENT:   Head: Normocephalic and atraumatic.    Eyes: Pupils are equal, round, and reactive to light.     Cardiovascular:  Normal rate and regular rhythm.      Exam reveals no edema.        Pulmonary/Chest: Effort normal and breath sounds normal. No respiratory distress.        Abdominal: Soft. Bowel sounds are normal. She exhibits no distension and no mass. There is no abdominal tenderness. There is no rebound and no guarding.     Genitourinary:    Vagina normal.      Pelvic exam was performed with patient supine.   The external female genitalia was normal.   No external genitalia lesions identified,Genitalia hair distrobution normal .   No vaginal discharge in the vagina.    Genitourinary Comments: Chaperone present    NO Lesions seen on external or internal genitalia by visual exam with and without speculum    Cervix 2/80/-3             Musculoskeletal: Normal range of motion.       Neurological: She is alert and oriented to person, place, and time. She has normal reflexes. She displays normal reflexes. She exhibits normal muscle tone.    Skin: Skin is warm and dry. No rash noted. No erythema.    Psychiatric: She has a normal mood and affect. Her behavior is normal.        Cervix:  Dilation:  2  Effacement:  80  Station: -3  Presentation: Vertex     Significant Labs:  Lab Results   Component Value Date    GROUPTRH O POS 05/08/2024    HEPBSAG Non-Reactive 05/08/2024       I have personallly reviewed all pertinent lab results from the last 24 hours.  Recent Lab Results         12/09/24  0045   12/08/24  0435        Appearance, UA Clear   Turbid       Bacteria, UA   Moderate       Bilirubin (UA) Negative   Negative       Color, UA Colorless   Light-Yellow       Glucose, UA Normal   Normal       Ketones, UA Negative   Negative       Leukocyte Esterase, UA Negative   Trace       Mucous   Occasional       NITRITE UA Negative   Negative       Blood, UA Negative   Moderate       pH, UA 7.0   7.0       Protein, UA  Negative   Negative       RBC, UA   5       Spec Grav UA 1.006   1.008       Squamous Epithelial Cells, UA   Few       UROBILINOGEN UA Normal   Normal       WBC, UA   5             Assessment/Plan:     24 y.o. female  at 40w1d for:    * Pelvic pain affecting pregnancy in third trimester, antepartum  Patient admitted in labor  Routine L&D order set  Expect   Desires Epidural    Rh+ (O+)  Rubella Immune  GBS negative    40 weeks gestation of pregnancy    Rh+ (O+)  Rubella Immune  GBS negative    PMHx: IBS-C    Consent for admission, vaginal delivery,  if needed and blood products if needed   Patient and her mother's questions encouraged and answered to their apparent satisfaction      History of herpes genitalis    NO lesions seen by Dr Lowe's exam or my exam on admission - see PE  Counseled on ACOG recommendations    After counseling on labor/vaginal delivery patient willing to attempt vaginal delivery          Christian Barrett MD  Obstetrics  [facility] -  Labor and Delivery

## 2024-12-09 NOTE — ASSESSMENT & PLAN NOTE
Patient admitted in labor  Routine L&D order set  Expect   Desires Epidural    Rh+ (O+)  Rubella Immune  GBS negative

## 2024-12-09 NOTE — HOSPITAL COURSE
Pt is a G1 @ 40 weeks 1 days that presented in spontaneous labor. She experienced an arrest of dilatation at 7 cm with an edematous cervix and it was decided to proceed with a low transverse  section. Pt delivered a 9 pound 9.1 ounce female via low transverse   section. She is bottle feeding without difficulty. She is anemic and did receive iron supplementation. She is being discharged home with clindamycin secondary to elevated WBC post delivery, leukocytosis. She is to continue iron supplementation secondary to anemia

## 2024-12-09 NOTE — ASSESSMENT & PLAN NOTE
NO lesions seen by Dr Lowe's exam or my exam on admission - see PE  Counseled on ACOG recommendations    After counseling on labor/vaginal delivery patient willing to attempt vaginal delivery

## 2024-12-09 NOTE — ASSESSMENT & PLAN NOTE
Proceed with primary  Section  Risk of  section discussed to include pain, infection, injury to bladder and or bowel, injury to other organs.   Informed to being ambulation when allowed post delivery.  Verbalized understanding to all instructions and information  Questions answered to desired level of satisfaction  Dr. Zhao aware of plan of care and mutually agrees

## 2024-12-09 NOTE — ANESTHESIA PROCEDURE NOTES
Epidural    Patient location during procedure: OB   Reason for block: primary anesthetic   Reason for block: labor analgesia requested by patient and obstetrician  Diagnosis: IUP   Start time: 12/9/2024 4:13 AM  Timeout: 12/9/2024 4:12 AM  End time: 12/9/2024 4:22 AM    Staffing  Performing Provider: Los Aggarwal MD  Authorizing Provider: Los Aggarwal MD    Staffing  Performed by: Los Aggarwal MD  Authorized by: Los Aggarwal MD        Preanesthetic Checklist  Completed: patient identified, pre-op evaluation, timeout performed, anesthesia consent given, hand hygiene performed and patient being monitored  Preparation  Patient position: sitting  Prep: Betadine  Reason for block: primary anesthetic   Epidural  Skin Anesthetic: lidocaine 1%  Administration type: single shot  Approach: midline  Interspace: L4-5    Injection technique: AILYN air  Needle and Epidural Catheter  Insertion Attempts: 1  Test dose: 3 mL of lidocaine 1.5% with Epi 1-to-200,000  Additional Documentation: negative aspiration for heme and CSF  Needle localization: anatomical landmarks  Assessment  Ease of block: easy  Additional Notes  Informed consent. Aseptic technique.   L4/5 epidural catheter. Standard PCEA.   No complications.         Medications:    Medications: LIDOcaine (PF) 20 mg/mL (2%) injection - Epidural   10 mL - 12/9/2024 4:21:00 AM

## 2024-12-09 NOTE — SUBJECTIVE & OBJECTIVE
Obstetric HPI:  Patient reports increasing frequency and intensity of contractions, active fetal movement, No vaginal bleeding , No loss of fluid     This pregnancy has been complicated by Hx HSV (last outbreak ) on Valtrex     OB History    Para Term  AB Living   1 0 0 0 0 0   SAB IAB Ectopic Multiple Live Births   0 0 0 0 0      # Outcome Date GA Lbr Tyson/2nd Weight Sex Type Anes PTL Lv   1 Current              Past Medical History:   Diagnosis Date    History of irritable bowel syndrome      No past surgical history on file.    PTA Medications   Medication Sig    ferrous sulfate 325 (65 FE) MG EC tablet Take 1 tablet (325 mg total) by mouth 2 (two) times daily.    ondansetron (ZOFRAN-ODT) 8 MG TbDL Take 1 tablet (8 mg total) by mouth 3 (three) times daily.    PNV no.153/FA/om3/dha/epa/fish (PRENATAL GUMMIES ORAL) Take by mouth.    prenatal no118-iron-folic acid 29 mg iron- 1 mg Chew Take 1 tablet by mouth once daily. (Patient not taking: Reported on 10/9/2024)    valACYclovir (VALTREX) 1000 MG tablet Take 1 tablet (1,000 mg total) by mouth once daily.       Review of patient's allergies indicates:   Allergen Reactions    Benadryl allergy-sinus Hives        Family History       Problem Relation (Age of Onset)    Diabetes Father    Hypertension Mother, Sister    Ovarian cancer Maternal Grandmother          Tobacco Use    Smoking status: Former     Types: Cigarettes     Passive exposure: Never    Smokeless tobacco: Never   Substance and Sexual Activity    Alcohol use: Not Currently    Drug use: Never    Sexual activity: Not Currently     Partners: Male     Review of Systems   Constitutional:  Negative for chills and fever.   Respiratory:  Negative for cough and shortness of breath.    Cardiovascular:  Negative for chest pain and leg swelling.   Gastrointestinal:  Positive for abdominal pain (contractions). Negative for blood in stool, constipation, diarrhea, nausea and vomiting.   Genitourinary:   Positive for pelvic pain (contractions). Negative for dysmenorrhea, dyspareunia, dysuria, flank pain, frequency, genital sores, hematuria, urgency, vaginal bleeding, vaginal discharge, vaginal pain and vaginal odor.   Musculoskeletal:  Negative for back pain.   Neurological:  Negative for syncope and headaches.   Psychiatric/Behavioral:  Negative for depression. The patient is not nervous/anxious.       Objective:     Vital Signs (Most Recent):  Temp: 98.2 °F (36.8 °C) (12/09/24 0010)  Pulse: 98 (12/09/24 0010)  Resp: 18 (12/09/24 0010)  BP: 122/80 (12/09/24 0010)  SpO2: 99 % (12/09/24 0010) Vital Signs (24h Range):  Temp:  [98.2 °F (36.8 °C)] 98.2 °F (36.8 °C)  Pulse:  [85-98] 98  Resp:  [18] 18  SpO2:  [99 %] 99 %  BP: (120-122)/(73-80) 122/80     Weight: 85.5 kg (188 lb 9.6 oz)  Body mass index is 29.54 kg/m².    FHT: 140 Cat 1 (reassuring)  TOCO:  Q 1-3 minutes     Physical Exam:   Constitutional: She is oriented to person, place, and time. She appears well-developed and well-nourished.    HENT:   Head: Normocephalic and atraumatic.    Eyes: Pupils are equal, round, and reactive to light.     Cardiovascular:  Normal rate and regular rhythm.      Exam reveals no edema.        Pulmonary/Chest: Effort normal and breath sounds normal. No respiratory distress.        Abdominal: Soft. Bowel sounds are normal. She exhibits no distension and no mass. There is no abdominal tenderness. There is no rebound and no guarding.     Genitourinary:    Vagina normal.      Pelvic exam was performed with patient supine.   The external female genitalia was normal.   No external genitalia lesions identified,Genitalia hair distrobution normal .   No vaginal discharge in the vagina.    Genitourinary Comments: Chaperone present    NO Lesions seen on external or internal genitalia by viusal exam with and without speculum    Cervix 2/80/-3             Musculoskeletal: Normal range of motion.       Neurological: She is alert and oriented to  person, place, and time. She has normal reflexes. She displays normal reflexes. She exhibits normal muscle tone.    Skin: Skin is warm and dry. No rash noted. No erythema.    Psychiatric: She has a normal mood and affect. Her behavior is normal.        Cervix:  Dilation:  2  Effacement:  80  Station: -3  Presentation: Vertex     Significant Labs:  Lab Results   Component Value Date    GROUPTRH O POS 05/08/2024    HEPBSAG Non-Reactive 05/08/2024       I have personallly reviewed all pertinent lab results from the last 24 hours.  Recent Lab Results         12/09/24  0045   12/08/24  0435        Appearance, UA Clear   Turbid       Bacteria, UA   Moderate       Bilirubin (UA) Negative   Negative       Color, UA Colorless   Light-Yellow       Glucose, UA Normal   Normal       Ketones, UA Negative   Negative       Leukocyte Esterase, UA Negative   Trace       Mucous   Occasional       NITRITE UA Negative   Negative       Blood, UA Negative   Moderate       pH, UA 7.0   7.0       Protein, UA Negative   Negative       RBC, UA   5       Spec Grav UA 1.006   1.008       Squamous Epithelial Cells, UA   Few       UROBILINOGEN UA Normal   Normal       WBC, UA   5

## 2024-12-09 NOTE — ED TRIAGE NOTES
Rec'd 23y/o  at 40.1wks c/o contractions that started around 0300 yesterday.  States that she came to hospital earlier today and they told her she was in early labor. Also states that the contractions have gotten worse since then

## 2024-12-09 NOTE — SUBJECTIVE & OBJECTIVE
Interval History:  Negro is a 24 y.o.  at 40w1d. She is doing well since epidural was placed.    Objective:     Vital Signs (Most Recent):  Temp: 97.9 °F (36.6 °C) (24 0715)  Pulse: (!) 111 (24 0800)  Resp: 17 (24)  BP: 108/72 (24 0800)  SpO2: 98 % (24 08) Vital Signs (24h Range):  Temp:  [97.9 °F (36.6 °C)-98.2 °F (36.8 °C)] 97.9 °F (36.6 °C)  Pulse:  [] 111  Resp:  [17-18] 17  SpO2:  [97 %-99 %] 98 %  BP: ()/(57-81) 108/72     Weight: 85.5 kg (188 lb 9.6 oz)  Body mass index is 29.54 kg/m².    FHT: 130s Cat 1 (reassuring)  TOCO:  Q 2-4 minutes    Cervical Exam:  Dilation:  5  Effacement:  95%  Station: -1  Presentation: Vertex   SROM with clear fluid noted  IUPC inserted with clear return noted  FSE applied to bony prominence    Significant Labs:  Lab Results   Component Value Date    GROUPTRH O POS 2024    HEPBSAG Non-Reactive 2024       I have personallly reviewed all pertinent lab results from the last 24 hours.  Recent Lab Results         24  0158   24  0045        Albumin/Globulin Ratio 0.9         Albumin 2.9                  ALT <7         Anion Gap 14         Appearance, UA   Clear       AST 21         Baso # 0.02         Basophil % 0.1         Bilirubin (UA)   Negative       BILIRUBIN TOTAL 0.4         BUN 5         BUN/CREAT RATIO 7         Calcium 8.7         Chloride 109         CO2 16         Color, UA   Colorless       Creatinine 0.70         Differential Method Auto         eGFR 124         Eos # 0.03         Eos % 0.2         Globulin, Total 3.2         Glucose 113         Glucose, UA   Normal       Group & Rh O POS         Hematocrit 30.9         Hemoglobin 9.6         Hepatitis B Surface Ag Non-Reactive         HIV 1/2 Ag/Ab Non-Reactive  Comment: Upon Admission. PRN third-trimester screen not lloyd         Immature Grans (Abs) 0.23         Immature Granulocytes 1.6         INDIRECT ABEL NEG         Ketones, UA    Negative       Leukocyte Esterase, UA   Negative       Lymph # 1.76         Lymph % 11.9         MCH 27.0         MCHC 31.1         MCV 87.0         Mono # 1.38         Mono % 9.3         MPV 10.2         Neutrophils, Abs 11.35         Neutrophils Relative 76.9         NITRITE UA   Negative       nRBC 0.0         NUCLEATED RBC ABSOLUTE 0.00         Blood, UA   Negative       pH, UA   7.0       Platelet Count 298         Potassium 3.7         PROTEIN TOTAL 6.1         Protein, UA   Negative       RBC 3.55         RDW 15.2         Sodium 135         Spec Grav UA   1.006       Specimen Outdate 12/12/2024 23:59         Syphilis Ab Interpretation Non-Reactive  Comment: Upon Admission. PRN third-trimester screen not lloyd  0.0 - 0.9: Non-Reactive  0.91 - 1.10: Equivocal with RPR to follow  >1.10:  Reactive with RPR to Follow         UROBILINOGEN UA   Normal       WBC 14.77                 Physical Exam:   Constitutional: She is oriented to person, place, and time. She appears well-developed and well-nourished.        Pulmonary/Chest: Effort normal.        Abdominal: Soft.                 Neurological: She is alert and oriented to person, place, and time.    Skin: Skin is warm and dry.    Psychiatric: She has a normal mood and affect. Her behavior is normal. Judgment and thought content normal.       Review of Systems   All other systems reviewed and are negative.

## 2024-12-09 NOTE — ED PROVIDER NOTES
Encounter Date: 2024    PATI Physician: Christian Barrett   Primary OBGYN:Mariia Reid CNM    Admit Diagnosis/Chief Complaint: Contractions  History     Chief Complaint   Patient presents with    Contractions       23yo  at 40w1d seen around 0300  and found to have q6-10' contractions unchanged from office visit.  Returns with Ctx q2-3 min.    Patient nervous about Hx of HSV.  Dr Lowe did a speculum exam at last PATI visit  and found no lesions.  She did take her Valtrex yesterday but has missed some doses.  Has NO prodromal symptoms.            Obstetric HPI:  Patient reports increasing frequency and intensity of contractions, active fetal movement, absent vaginal bleeding , absent loss of fluid      Objective:     Vital Signs (Most Recent):  Temp: 98.2 °F (36.8 °C) (24)  Pulse: 98 (24)  Resp: 18 (24)  BP: 122/80 (24)  SpO2: 99 % (24) Vital Signs (24h Range):  Temp:  [98.2 °F (36.8 °C)] 98.2 °F (36.8 °C)  Pulse:  [85-98] 98  Resp:  [18] 18  SpO2:  [99 %] 99 %  BP: (120-122)/(73-80) 122/80     Weight: 85.5 kg (188 lb 9.6 oz)  Body mass index is 29.54 kg/m².    FHT: 135 Cat 1 (reassuring)  TOCO:  Q 2 minutes    No intake or output data in the 24 hours ending 24 0122    Cervical Exam:  Dilation:  2  Effacement:  80  Station: -3  Presentation: Vertex     Significant Labs:  Recent Lab Results         24  0045   24  0435        Appearance, UA Clear   Turbid       Bacteria, UA   Moderate       Bilirubin (UA) Negative   Negative       Color, UA Colorless   Light-Yellow       Glucose, UA Normal   Normal       Ketones, UA Negative   Negative       Leukocyte Esterase, UA Negative   Trace       Mucous   Occasional       NITRITE UA Negative   Negative       Blood, UA Negative   Moderate       pH, UA 7.0   7.0       Protein, UA Negative   Negative       RBC, UA   5       Spec Grav UA 1.006   1.008       Squamous Epithelial Cells, UA   Few        UROBILINOGEN UA Normal   Normal       WBC, UA   5             Review of patient's allergies indicates:   Allergen Reactions    Benadryl allergy-sinus Hives     Past Medical History:   Diagnosis Date    History of irritable bowel syndrome      No past surgical history on file.  Family History   Problem Relation Name Age of Onset    Ovarian cancer Maternal Grandmother      Diabetes Father      Hypertension Mother      Hypertension Sister       Social History     Tobacco Use    Smoking status: Former     Types: Cigarettes     Passive exposure: Never    Smokeless tobacco: Never   Substance Use Topics    Alcohol use: Not Currently    Drug use: Never     Review of Systems   Constitutional:  Negative for chills and fever.   HENT:  Negative for sore throat.    Eyes:  Negative for photophobia and visual disturbance.   Respiratory:  Negative for shortness of breath.    Cardiovascular:  Negative for chest pain and palpitations.   Gastrointestinal:  Positive for abdominal pain (ctx). Negative for blood in stool, constipation, diarrhea, nausea and vomiting.   Genitourinary:  Positive for pelvic pain (ctx). Negative for dysuria, genital sores (no outbreaks since 2019), hematuria, urgency, vaginal bleeding and vaginal discharge.   Musculoskeletal:  Negative for back pain.   Skin:  Negative for rash.   Neurological:  Negative for dizziness, seizures, syncope, weakness, light-headedness and headaches.   Hematological:  Does not bruise/bleed easily.       Physical Exam     Initial Vitals [12/09/24 0010]   BP Pulse Resp Temp SpO2   122/80 98 18 98.2 °F (36.8 °C) 99 %      MAP       --         Physical Exam    Constitutional: She appears well-developed and well-nourished. No distress.   HENT:   Head: Normocephalic and atraumatic.   Eyes: Pupils are equal, round, and reactive to light. No scleral icterus.   Neck: Neck supple.   Normal range of motion.  Cardiovascular:  Normal rate, regular rhythm and normal heart sounds.     Exam  reveals no gallop and no friction rub.       No murmur heard.  Pulmonary/Chest: Breath sounds normal. No respiratory distress. She has no wheezes. She has no rhonchi. She has no rales. She exhibits no tenderness.   Abdominal: Abdomen is soft. She exhibits no distension. There is no abdominal tenderness.   Uterus gravid, nontender There is no rebound and no guarding.   Genitourinary:    Vagina normal.      No vaginal discharge.      Genitourinary Comments:   RN Chaperone present     No external genital lesions seen  SSE: No genital lesions seen.    No pooling.  No blood seen at or in vault    Cervix 2/80/-3       Musculoskeletal:         General: Normal range of motion.      Cervical back: Normal range of motion and neck supple.     Neurological: She is alert and oriented to person, place, and time. She has normal reflexes. She displays normal reflexes.         ED Course     Labs Reviewed   URINALYSIS, REFLEX TO URINE CULTURE       Result Value    Color, UA Colorless      Clarity, UA Clear      pH, UA 7.0      Leukocytes, UA Negative      Nitrites, UA Negative      Protein, UA Negative      Glucose, UA Normal      Ketones, UA Negative      Urobilinogen, UA Normal      Bilirubin, UA Negative      Blood, UA Negative      Specific Gravity, UA 1.006          Imaging Results    None          Medical Decision Making    Contractions    Fetal monitoring / tocometry    Serial cervical exams    Risk  Decision regarding hospitalization.       Medications - No data to display                           Clinical Impression:   Final diagnoses:    [Z3A.40] 40 weeks gestation of pregnancy - Rh+ /RI /GBS neg    [O26.893, R10.2] Pelvic pain affecting pregnancy in third trimester, antepartum - Admit in labor (Primary)     Routine L&D order set     Desires epidural     ED Disposition Condition    Admit Stable                Christian Barrett MD  12/09/24 0123

## 2024-12-09 NOTE — PROGRESS NOTES
[facility] -  Labor and Delivery  Obstetrics  Labor Progress Note    Patient Name: Negro Park  MRN: [id number]  Admission Date: 2024  Hospital Length of Stay: 0 days  Attending Physician: Hu Alvarez MD  Primary Care Provider: Diane, Primary Doctor    Subjective:     Principal Problem:Pelvic pain affecting pregnancy in third trimester, antepartum    Hospital Course:  No notes on file    Interval History:  Negro is a 24 y.o.  at 40w1d. She is doing well since epidural was placed.    Objective:     Vital Signs (Most Recent):  Temp: 97.9 °F (36.6 °C) (24)  Pulse: (!) 111 (24)  Resp: 17 (24)  BP: 108/72 (24)  SpO2: 98 % (24) Vital Signs (24h Range):  Temp:  [97.9 °F (36.6 °C)-98.2 °F (36.8 °C)] 97.9 °F (36.6 °C)  Pulse:  [] 111  Resp:  [17-18] 17  SpO2:  [97 %-99 %] 98 %  BP: ()/(57-81) 108/72     Weight: 85.5 kg (188 lb 9.6 oz)  Body mass index is 29.54 kg/m².    FHT: 130s Cat 1 (reassuring)  TOCO:  Q 2-4 minutes    Cervical Exam:  Dilation:  5  Effacement:  95%  Station: -1  Presentation: Vertex   SROM with clear fluid noted  IUPC inserted with clear return noted  FSE applied to bony prominence    Significant Labs:  Lab Results   Component Value Date    GROUPTRH O POS 2024    HEPBSAG Non-Reactive 2024       I have personallly reviewed all pertinent lab results from the last 24 hours.  Recent Lab Results         24  0158   24  0045        Albumin/Globulin Ratio 0.9         Albumin 2.9                  ALT <7         Anion Gap 14         Appearance, UA   Clear       AST 21         Baso # 0.02         Basophil % 0.1         Bilirubin (UA)   Negative       BILIRUBIN TOTAL 0.4         BUN 5         BUN/CREAT RATIO 7         Calcium 8.7         Chloride 109         CO2 16         Color, UA   Colorless       Creatinine 0.70         Differential Method Auto         eGFR 124         Eos # 0.03          Eos % 0.2         Globulin, Total 3.2         Glucose 113         Glucose, UA   Normal       Group & Rh O POS         Hematocrit 30.9         Hemoglobin 9.6         Hepatitis B Surface Ag Non-Reactive         HIV 1/2 Ag/Ab Non-Reactive  Comment: Upon Admission. PRN third-trimester screen not lloyd         Immature Grans (Abs) 0.23         Immature Granulocytes 1.6         INDIRECT ABEL NEG         Ketones, UA   Negative       Leukocyte Esterase, UA   Negative       Lymph # 1.76         Lymph % 11.9         MCH 27.0         MCHC 31.1         MCV 87.0         Mono # 1.38         Mono % 9.3         MPV 10.2         Neutrophils, Abs 11.35         Neutrophils Relative 76.9         NITRITE UA   Negative       nRBC 0.0         NUCLEATED RBC ABSOLUTE 0.00         Blood, UA   Negative       pH, UA   7.0       Platelet Count 298         Potassium 3.7         PROTEIN TOTAL 6.1         Protein, UA   Negative       RBC 3.55         RDW 15.2         Sodium 135         Spec Grav UA   1.006       Specimen Outdate 2024 23:59         Syphilis Ab Interpretation Non-Reactive  Comment: Upon Admission. PRN third-trimester screen not lloyd  0.0 - 0.9: Non-Reactive  0.91 - 1.10: Equivocal with RPR to follow  >1.10:  Reactive with RPR to Follow         UROBILINOGEN UA   Normal       WBC 14.77                 Physical Exam:   Constitutional: She is oriented to person, place, and time. She appears well-developed and well-nourished.        Pulmonary/Chest: Effort normal.        Abdominal: Soft.                 Neurological: She is alert and oriented to person, place, and time.    Skin: Skin is warm and dry.    Psychiatric: She has a normal mood and affect. Her behavior is normal. Judgment and thought content normal.       Review of Systems   All other systems reviewed and are negative.    Assessment/Plan:     24 y.o. female  at 40w1d for:    * Pelvic pain affecting pregnancy in third trimester, antepartum  Patient admitted in  labor  Routine L&D order set  Expect   Desires Epidural    Rh+ (O+)  Rubella Immune  GBS negative    40 weeks gestation of pregnancy    Rh+ (O+)  Rubella Immune  GBS negative    PMHx: IBS-C    Consent for admission, vaginal delivery,  if needed and blood products if needed   Patient and her mother's questions encouraged and answered to their apparent satisfaction      History of herpes genitalis    NO lesions seen by Dr Lowe's exam or my exam on admission - see PE  Counseled on ACOG recommendations    After counseling on labor/vaginal delivery patient willing to attempt vaginal delivery            Mariia Reid CNM  Obstetrics  [facility] -  Labor and Delivery

## 2024-12-09 NOTE — ANESTHESIA PREPROCEDURE EVALUATION
2024  Negro Park is a 24 y.o., female.      Pre-op Assessment    I have reviewed the Patient Summary Reports.       I have reviewed the Medications.     Review of Systems         Anesthesia Plan  Type of Anesthesia, risks & benefits discussed:    Anesthesia Type: Epidural  Intra-op Monitoring Plan: Standard ASA Monitors  Informed Consent: Informed consent signed with the Patient and all parties understand the risks and agree with anesthesia plan.  All questions answered.   ASA Score: 2    Ready For Surgery From Anesthesia Perspective.     .  No known anesthetic complications  Allergies      Benadryl Allergy-sinus          Hct 31     at 40 weeks    Adequate ROM at neck    Plan is labor epidural

## 2024-12-09 NOTE — ASSESSMENT & PLAN NOTE
Rh+ (O+)  Rubella Immune  GBS negative    PMHx: IBS-C    Consent for admission, vaginal delivery,  if needed and blood products if needed   Patient and her mother's questions encouraged and answered to their apparent satisfaction

## 2024-12-09 NOTE — PROGRESS NOTES
[facility] -  Labor and Delivery  Obstetrics  Labor Progress Note    Patient Name: Negro Park  MRN: [id number]  Admission Date: 2024  Hospital Length of Stay: 0 days  Attending Physician: Hu Alvarez MD  Primary Care Provider: Diane, Primary Doctor    Subjective:     Principal Problem:Failure to progress in labor    Hospital Course:  Pt is a G1 @ 40 weeks 1 days that presented in spontaneous labor. She experienced an arrest of dilatation at 7 cm with an edematous cervix and it was decided to proceed with a low transverse  section.     Interval History:  Negro is a 24 y.o.  at 40w1d. She is comfortable with epidural in place and feels come contractions.     Objective:     Vital Signs (Most Recent):  Temp: 98.2 °F (36.8 °C) (24 1435)  Pulse: (!) 117 (24 1645)  Resp: 17 (24 0715)  BP: 114/74 (24 1630)  SpO2: 99 % (24 1645) Vital Signs (24h Range):  Temp:  [97.9 °F (36.6 °C)-98.2 °F (36.8 °C)] 98.2 °F (36.8 °C)  Pulse:  [] 117  Resp:  [17-18] 17  SpO2:  [97 %-100 %] 99 %  BP: ()/(53-88) 114/74     Weight: 85.5 kg (188 lb 9.6 oz)  Body mass index is 29.54 kg/m².    FHT: 150s Cat 1 (reassuring)  TOCO:  Q 2-3 minutes    Cervical Exam:  Dilation:  7  Effacement:  50%  Station: -3  Presentation: Vertex cervix edematous     Significant Labs:  Lab Results   Component Value Date    GROUPTRH O POS 2024    HEPBSAG Non-Reactive 2024       Recent Lab Results         24  0158   24  0045        Albumin/Globulin Ratio 0.9         Albumin 2.9                  ALT <7         Anion Gap 14         Appearance, UA   Clear       AST 21         Baso # 0.02         Basophil % 0.1         Bilirubin (UA)   Negative       BILIRUBIN TOTAL 0.4         BUN 5         BUN/CREAT RATIO 7         Calcium 8.7         Chloride 109         CO2 16         Color, UA   Colorless       Creatinine 0.70         Differential Method Auto         eGFR 124          Eos # 0.03         Eos % 0.2         Globulin, Total 3.2         Glucose 113         Glucose, UA   Normal       Group & Rh O POS         Hematocrit 30.9         Hemoglobin 9.6         Hepatitis B Surface Ag Non-Reactive         HIV 1/2 Ag/Ab Non-Reactive  Comment: Upon Admission. PRN third-trimester screen not lloyd         Immature Grans (Abs) 0.23         Immature Granulocytes 1.6         INDIRECT ABEL NEG         Ketones, UA   Negative       Leukocyte Esterase, UA   Negative       Lymph # 1.76         Lymph % 11.9         MCH 27.0         MCHC 31.1         MCV 87.0         Mono # 1.38         Mono % 9.3         MPV 10.2         Neutrophils, Abs 11.35         Neutrophils Relative 76.9         NITRITE UA   Negative       nRBC 0.0         NUCLEATED RBC ABSOLUTE 0.00         Blood, UA   Negative       pH, UA   7.0       Platelet Count 298         Potassium 3.7         PROTEIN TOTAL 6.1         Protein, UA   Negative       RBC 3.55         RDW 15.2         Sodium 135         Spec Grav UA   1.006       Specimen Outdate 2024 23:59         Syphilis Ab Interpretation Non-Reactive  Comment: Upon Admission. PRN third-trimester screen not lloyd  0.0 - 0.9: Non-Reactive  0.91 - 1.10: Equivocal with RPR to follow  >1.10:  Reactive with RPR to Follow         UROBILINOGEN UA   Normal       WBC 14.77                 Physical Exam:   Constitutional: She is oriented to person, place, and time. She appears well-developed and well-nourished.                       Musculoskeletal: Moves all extremeties.       Neurological: She is alert and oriented to person, place, and time.         Review of Systems   All other systems reviewed and are negative.    Assessment/Plan:     24 y.o. female  at 40w1d for:    * Failure to progress in labor  Proceed with primary  Section  Risk of  section discussed to include pain, infection, injury to bladder and or bowel, injury to other organs.   Informed to being ambulation  when allowed post delivery.  Verbalized understanding to all instructions and information  Questions answered to desired level of satisfaction  Dr. Zhao aware of plan of care and mutually agrees    Pelvic pain affecting pregnancy in third trimester, antepartum  Patient admitted in labor  Routine L&D order set  Expect   Desires Epidural    Rh+ (O+)  Rubella Immune  GBS negative    40 weeks gestation of pregnancy    Rh+ (O+)  Rubella Immune  GBS negative    PMHx: IBS-C    Consent for admission, vaginal delivery,  if needed and blood products if needed   Patient and her mother's questions encouraged and answered to their apparent satisfaction      History of herpes genitalis    NO lesions seen by Dr Lowe's exam or my exam on admission - see PE  Counseled on ACOG recommendations    After counseling on labor/vaginal delivery patient willing to attempt vaginal delivery            Mariia Reid CNM  Obstetrics  [facility] -  Labor and Delivery

## 2024-12-09 NOTE — HPI
Expand All Collapse All    Encounter Date: 2024     PATI Physician: Christian Barrett   Primary OBGYN:Mariia Reid CNM     Admit Diagnosis/Chief Complaint: Contractions  History          Chief Complaint   Patient presents with    Contractions         25yo  at 40w1d seen around 0300  and found to have q6-10' contractions unchanged from office visit.  Returns with Ctx q2-3 min.     Patient nervous about Hx of HSV.  Dr Lowe did a speculum exam at last PATI visit  and found no lesions.  She did take her Valtrex yesterday but has missed some doses.  Has NO prodromal symptoms.

## 2024-12-10 PROBLEM — Z98.891 STATUS POST PRIMARY LOW TRANSVERSE CESAREAN SECTION: Status: ACTIVE | Noted: 2024-12-10

## 2024-12-10 PROBLEM — D72.829 LEUKOCYTOSIS: Status: ACTIVE | Noted: 2024-12-10

## 2024-12-10 LAB
ANISOCYTOSIS BLD QL SMEAR: ABNORMAL
BASOPHILS # BLD AUTO: 0.05 K/UL (ref 0–0.2)
BASOPHILS NFR BLD AUTO: 0.2 % (ref 0–1)
CRENATED CELLS: ABNORMAL
DIFFERENTIAL METHOD BLD: ABNORMAL
EOSINOPHIL # BLD AUTO: 0.04 K/UL (ref 0–0.5)
EOSINOPHIL NFR BLD AUTO: 0.2 % (ref 1–4)
ERYTHROCYTE [DISTWIDTH] IN BLOOD BY AUTOMATED COUNT: 15.2 % (ref 11.5–14.5)
HCT VFR BLD AUTO: 26.6 % (ref 38–47)
HGB BLD-MCNC: 8.2 G/DL (ref 12–16)
HYPOCHROMIA BLD QL SMEAR: ABNORMAL
IMM GRANULOCYTES # BLD AUTO: 0.39 K/UL (ref 0–0.04)
IMM GRANULOCYTES NFR BLD: 1.5 % (ref 0–0.4)
LYMPHOCYTES # BLD AUTO: 1.16 K/UL (ref 1–4.8)
LYMPHOCYTES NFR BLD AUTO: 4.5 % (ref 27–41)
LYMPHOCYTES NFR BLD MANUAL: 5 % (ref 27–41)
MCH RBC QN AUTO: 26.9 PG (ref 27–31)
MCHC RBC AUTO-ENTMCNC: 30.8 G/DL (ref 32–36)
MCV RBC AUTO: 87.2 FL (ref 80–96)
MONOCYTES # BLD AUTO: 2.93 K/UL (ref 0–0.8)
MONOCYTES NFR BLD AUTO: 11.3 % (ref 2–6)
MONOCYTES NFR BLD MANUAL: 7 % (ref 2–6)
MPC BLD CALC-MCNC: 10.4 FL (ref 9.4–12.4)
NEUTROPHILS # BLD AUTO: 21.31 K/UL (ref 1.8–7.7)
NEUTROPHILS NFR BLD AUTO: 82.3 % (ref 53–65)
NEUTS BAND NFR BLD MANUAL: 4 % (ref 1–5)
NEUTS SEG NFR BLD MANUAL: 84 % (ref 50–62)
NRBC # BLD AUTO: 0 X10E3/UL
NRBC, AUTO (.00): 0 %
PLATELET # BLD AUTO: 218 K/UL (ref 150–400)
PLATELET MORPHOLOGY: ABNORMAL
POLYCHROMASIA BLD QL SMEAR: ABNORMAL
RBC # BLD AUTO: 3.05 M/UL (ref 4.2–5.4)
UA COMPLETE W REFLEX CULTURE PNL UR: NORMAL
WBC # BLD AUTO: 25.88 K/UL (ref 4.5–11)

## 2024-12-10 PROCEDURE — 25000003 PHARM REV CODE 250: Performed by: OBSTETRICS & GYNECOLOGY

## 2024-12-10 PROCEDURE — 11000001 HC ACUTE MED/SURG PRIVATE ROOM

## 2024-12-10 PROCEDURE — 36415 COLL VENOUS BLD VENIPUNCTURE: CPT | Performed by: OBSTETRICS & GYNECOLOGY

## 2024-12-10 PROCEDURE — 63600175 PHARM REV CODE 636 W HCPCS: Performed by: OBSTETRICS & GYNECOLOGY

## 2024-12-10 PROCEDURE — 85025 COMPLETE CBC W/AUTO DIFF WBC: CPT | Performed by: OBSTETRICS & GYNECOLOGY

## 2024-12-10 RX ORDER — OXYCODONE AND ACETAMINOPHEN 5; 325 MG/1; MG/1
1 TABLET ORAL EVERY 4 HOURS PRN
Status: DISCONTINUED | OUTPATIENT
Start: 2024-12-10 | End: 2024-12-10

## 2024-12-10 RX ORDER — OXYCODONE AND ACETAMINOPHEN 10; 325 MG/1; MG/1
1 TABLET ORAL EVERY 4 HOURS PRN
Status: DISCONTINUED | OUTPATIENT
Start: 2024-12-10 | End: 2024-12-11 | Stop reason: HOSPADM

## 2024-12-10 RX ORDER — OXYCODONE AND ACETAMINOPHEN 10; 325 MG/1; MG/1
1 TABLET ORAL EVERY 4 HOURS PRN
Status: DISCONTINUED | OUTPATIENT
Start: 2024-12-10 | End: 2024-12-10

## 2024-12-10 RX ORDER — HYDROCODONE BITARTRATE AND ACETAMINOPHEN 7.5; 325 MG/1; MG/1
1 TABLET ORAL EVERY 4 HOURS PRN
Status: DISCONTINUED | OUTPATIENT
Start: 2024-12-10 | End: 2024-12-11 | Stop reason: HOSPADM

## 2024-12-10 RX ADMIN — MUPIROCIN: 20 OINTMENT TOPICAL at 08:12

## 2024-12-10 RX ADMIN — HYDROCODONE BITARTRATE AND ACETAMINOPHEN 1 TABLET: 7.5; 325 TABLET ORAL at 03:12

## 2024-12-10 RX ADMIN — DOCUSATE SODIUM 200 MG: 100 CAPSULE, LIQUID FILLED ORAL at 08:12

## 2024-12-10 RX ADMIN — Medication 1 TABLET: at 08:12

## 2024-12-10 RX ADMIN — OXYCODONE HYDROCHLORIDE AND ACETAMINOPHEN 1 TABLET: 5; 325 TABLET ORAL at 08:12

## 2024-12-10 RX ADMIN — VALACYCLOVIR HYDROCHLORIDE 1000 MG: 500 TABLET, FILM COATED ORAL at 08:12

## 2024-12-10 RX ADMIN — SIMETHICONE 80 MG: 80 TABLET, CHEWABLE ORAL at 12:12

## 2024-12-10 RX ADMIN — SIMETHICONE 80 MG: 80 TABLET, CHEWABLE ORAL at 09:12

## 2024-12-10 RX ADMIN — KETOROLAC TROMETHAMINE 30 MG: 30 INJECTION, SOLUTION INTRAMUSCULAR at 06:12

## 2024-12-10 RX ADMIN — DOCUSATE SODIUM 200 MG: 100 CAPSULE, LIQUID FILLED ORAL at 09:12

## 2024-12-10 RX ADMIN — KETOROLAC TROMETHAMINE 30 MG: 30 INJECTION, SOLUTION INTRAMUSCULAR at 03:12

## 2024-12-10 NOTE — TRANSFER OF CARE
"Anesthesia Transfer of Care Note    Patient: Negro Park    Procedure(s) Performed: Procedure(s) (LRB):   SECTION (N/A)    Patient location: Labor and Delivery    Anesthesia Type: general    Transport from OR: Transported from OR on room air with adequate spontaneous ventilation    Post pain: adequate analgesia    Post assessment: no apparent anesthetic complications    Post vital signs: stable    Level of consciousness: oriented and responds to stimulation    Nausea/Vomiting: no nausea/vomiting    Complications: none    Transfer of care protocol was followedComments: Report to RN at bedside. NAD. VSS.       Last vitals: Visit Vitals  /80 (BP Location: Right arm, Patient Position: Lying)   Pulse (!) 116   Temp 36.8 °C (98.2 °F)   Resp 17   Ht 5' 7" (1.702 m)   Wt 85.5 kg (188 lb 9.6 oz)   LMP 2024 (Exact Date)   SpO2 96%   Breastfeeding No   BMI 29.54 kg/m²     "

## 2024-12-10 NOTE — PLAN OF CARE
Problem:  Fall Injury Risk  Goal: Absence of Fall, Infant Drop and Related Injury  2024 190 by Elia Winters RN  Outcome: Progressing  2024 190 by Elia Winters RN  Outcome: Progressing     Problem: Adult Inpatient Plan of Care  Goal: Plan of Care Review  2024 190 by Elia Winters RN  Outcome: Progressing  2024 190 by Elia Winters RN  Outcome: Progressing  Goal: Patient-Specific Goal (Individualized)  2024 190 by Elia Winters RN  Outcome: Progressing  2024 190 by Elia Winters RN  Outcome: Progressing  Goal: Absence of Hospital-Acquired Illness or Injury  2024 by Elia Winters RN  Outcome: Progressing  2024 by Elia Winters RN  Outcome: Progressing  Goal: Optimal Comfort and Wellbeing  2024 190 by Elia Winters RN  Outcome: Progressing  2024 190 by Elia Winters RN  Outcome: Progressing  Goal: Readiness for Transition of Care  2024 190 by Elia Winters RN  Outcome: Progressing  2024 190 by Elia Winters RN  Outcome: Progressing     Problem: Infection  Goal: Absence of Infection Signs and Symptoms  2024 by Elia Witners RN  Outcome: Progressing  2024 190 by Elia Winters RN  Outcome: Progressing     Problem: Postpartum ( Delivery)  Goal: Successful Parent Role Transition  2024 190 by Elia Winters RN  Outcome: Progressing  2024 190 by Elia Winters RN  Outcome: Progressing  Goal: Hemostasis  2024 190 by Elia Winters RN  Outcome: Progressing  2024 190 by Elia Winters RN  Outcome: Progressing  Goal: Effective Bowel Elimination  2024 190 by Elia Winters RN  Outcome: Progressing  2024 1906 by Gallaspy, Malori C, RN  Outcome: Progressing  Goal: Fluid and Electrolyte Balance  2024 190 by Elia Winters, RN  Outcome: Progressing  2024 by  Elia Winters, RN  Outcome: Progressing  Goal: Absence of Infection Signs and Symptoms  12/9/2024 1906 by Elia Winters RN  Outcome: Progressing  12/9/2024 1906 by Elia Winters RN  Outcome: Progressing  Goal: Anesthesia/Sedation Recovery  12/9/2024 1906 by Elia Winters RN  Outcome: Progressing  12/9/2024 1906 by Elia Winters RN  Outcome: Progressing  Goal: Optimal Pain Control and Function  12/9/2024 1906 by Elia Winters RN  Outcome: Progressing  12/9/2024 1906 by Elia Winters RN  Outcome: Progressing  Goal: Nausea and Vomiting Relief  12/9/2024 1906 by Elia Winters RN  Outcome: Progressing  12/9/2024 1906 by Elia Winters RN  Outcome: Progressing  Goal: Effective Urinary Elimination  12/9/2024 1906 by Elia Winters RN  Outcome: Progressing  12/9/2024 1906 by Elia Winters RN  Outcome: Progressing  Goal: Effective Oxygenation and Ventilation  12/9/2024 1906 by Elia Winters RN  Outcome: Progressing  12/9/2024 1906 by Elia Winters RN  Outcome: Progressing

## 2024-12-10 NOTE — SUBJECTIVE & OBJECTIVE
Interval History: Post Op Day 1    She is doing well this evening. She is tolerating a regular diet without nausea or vomiting. She is voiding spontaneously. She is ambulating. She has passed flatus, and has not a BM. Vaginal bleeding is mild. She denies fever or chills. Abdominal pain is mild and controlled with oral medications. She Is not breastfeeding. She desires circumcision for her male baby: not applicable.    Objective:     Vital Signs (Most Recent):  Temp: 98.4 °F (36.9 °C) (12/10/24 1210)  Pulse: 73 (12/10/24 1210)  Resp: 18 (12/10/24 1518)  BP: (!) 94/51 (12/10/24 1210)  SpO2: 98 % (12/10/24 0618) Vital Signs (24h Range):  Temp:  [97.6 °F (36.4 °C)-98.4 °F (36.9 °C)] 98.4 °F (36.9 °C)  Pulse:  [] 73  Resp:  [16-18] 18  SpO2:  [94 %-100 %] 98 %  BP: ()/(51-80) 94/51     Weight: 85.5 kg (188 lb 9.6 oz)  Body mass index is 29.54 kg/m².      Intake/Output Summary (Last 24 hours) at 12/10/2024 1718  Last data filed at 12/10/2024 1455  Gross per 24 hour   Intake --   Output 4313 ml   Net -4313 ml         Significant Labs:  Lab Results   Component Value Date    GROUPTRH O POS 12/09/2024    HEPBSAG Non-Reactive 12/09/2024     Recent Labs   Lab 12/10/24  0529   HGB 8.2*   HCT 26.6*       I have personallly reviewed all pertinent lab results from the last 24 hours.  Recent Lab Results         12/10/24  0529        Aniso 1+       Bands 4       Baso # 0.05       Basophil % 0.2       Crenated RBC's Few       Differential Method Manual       Eos # 0.04       Eos % 0.2       Hematocrit 26.6       Hemoglobin 8.2       Hypo Few       Immature Grans (Abs) 0.39       Immature Granulocytes 1.5       Lymph # 1.16       Lymph % 4.5        5       MCH 26.9       MCHC 30.8       MCV 87.2       Mono # 2.93       Mono % 11.3        7       MPV 10.4       Neutrophils, Abs 21.31       Neutrophils Relative 82.3       nRBC 0.0       NUCLEATED RBC ABSOLUTE 0.00       PLATELET MORPHOLOGY Few Large Platelets       Platelet  Count 218       Poly Few       RBC 3.05       RDW 15.2       Segmented Neutrophils, Man % 84       WBC 25.88               Physical Exam:   Constitutional: She is oriented to person, place, and time. She appears well-developed and well-nourished.        Pulmonary/Chest: Effort normal.        Abdominal:   Incision: dry and intact, no edema, healing well     Genitourinary:    Genitourinary Comments: Uterus: firm, midline, 2 below umbilicus  Lochia: scant rubra noted on perineal pad             Musculoskeletal: Moves all extremeties.       Neurological: She is alert and oriented to person, place, and time.    Skin: Skin is warm and dry.    Psychiatric: She has a normal mood and affect. Her behavior is normal. Judgment and thought content normal.       Review of Systems   All other systems reviewed and are negative.

## 2024-12-10 NOTE — PLAN OF CARE
Problem:  Fall Injury Risk  Goal: Absence of Fall, Infant Drop and Related Injury  Outcome: Progressing     Problem: Adult Inpatient Plan of Care  Goal: Plan of Care Review  Outcome: Progressing  Goal: Patient-Specific Goal (Individualized)  Outcome: Progressing  Goal: Absence of Hospital-Acquired Illness or Injury  Outcome: Progressing  Goal: Optimal Comfort and Wellbeing  Outcome: Progressing  Goal: Readiness for Transition of Care  Outcome: Progressing     Problem: Infection  Goal: Absence of Infection Signs and Symptoms  Outcome: Progressing     Problem: Postpartum ( Delivery)  Goal: Successful Parent Role Transition  Outcome: Progressing  Goal: Hemostasis  Outcome: Progressing  Goal: Effective Bowel Elimination  Outcome: Progressing  Goal: Fluid and Electrolyte Balance  Outcome: Progressing  Goal: Absence of Infection Signs and Symptoms  Outcome: Progressing  Goal: Anesthesia/Sedation Recovery  Outcome: Progressing  Goal: Optimal Pain Control and Function  Outcome: Progressing  Goal: Nausea and Vomiting Relief  Outcome: Progressing  Goal: Effective Urinary Elimination  Outcome: Progressing  Goal: Effective Oxygenation and Ventilation  Outcome: Progressing     Problem: Wound  Goal: Optimal Coping  Outcome: Progressing  Goal: Optimal Functional Ability  Outcome: Progressing  Goal: Absence of Infection Signs and Symptoms  Outcome: Progressing  Goal: Improved Oral Intake  Outcome: Progressing  Goal: Optimal Pain Control and Function  Outcome: Progressing  Goal: Skin Health and Integrity  Outcome: Progressing  Goal: Optimal Wound Healing  Outcome: Progressing

## 2024-12-10 NOTE — L&D DELIVERY NOTE
[facility] -  Labor and Delivery   Section   Operative Note    SUMMARY     Date of Procedure: 2024     Procedure: Procedure(s) (LRB):   SECTION (N/A)    Surgeons and Role:     * Hu Alvarez MD - Primary    Assisting Surgeon: None    Pre-Operative Diagnosis: Failure to Progress  CPD  Post-Operative Diagnosis: Post-Op Diagnosis Codes:     * Pregnant [Z34.90]    Anesthesia: Spinal/Epidural    Technical Procedures Used: primary LTCS           Description of the Findings of the Procedure: liveborn female     Significant Surgical Tasks Conducted by the Assistant(s), if Applicable: Mariia Reid CNM    Complications: No    Blood Loss: 400 ml     With patient in supine position, the legs are  and Covarrubias Catheter placed and positioning to supine done.   Abdomen prepped with Chloroprep and 3 minute drying time allowed prior to draping of the abdomen.   Time out taken with OR team members.  Following informed consent the patient was taken to the operating room where spinal anesthesia was administered without difficulty. She was prepped and draped in the usual sterile fashion and placed in the dorsal lithotomy position. A Pfannenstiel skin incision was made with the scalpel and the underlying layer of fascia, entered with the Bovie. The fascial incision was extended laterally. The inferior and superior aspects of the fascial incision were grasped with Rushford clamps, elevated and the rectus muscles dissected off bluntly. The rectus muscles were then  in the midline and the peritoneum identified and entered with Metzenbaum scissors. This was then extended superiorly and inferiorly with good visualization of the bladder. The bladder blade was inserted and the vesicouterine peritoneum identified and entered with the Metzenbaum scissors. This incision extended superiorly and inferiorly and a bladder flap created digitally. The bladder blade was then reinserted and the  "uterus incised in a transverse fashion with the scalpel. This was extended laterally. The baby's head was delivered atraumatically. The nose and mouth were bulb suctioned. The cord was clamped and cut infant the infant was handed off to the waiting nurses. Cord blood was sent. Placenta was then removed manually and the uterus cleared of all clots and debris  The uterine incision was repaired with #1 Vicryl in a running locked fashion. A second layer of the same suture was used to obtain hemostasis. The gutters when cleared of all clots and debris and once again hemostasis found be satisfactory.  All instruments were then removed from the abdomen. The fascial incision per 0 Vicryl in a running fashion. Skin was closed with interrupted staples. At the end procedure all sponge lap needle sponge counts correct ×2. Patient taken cover awake and stable condition.        Specimens:   Specimen (24h ago, onward)      None            Condition: Good    VTE Risk Mitigation (From admission, onward)           Ordered     IP VTE LOW RISK PATIENT  Once         24 0131                    Disposition: PACU - hemodynamically stable.    Attestation: Good         Delivery Information for [place]    Birth information:  YOB: 2024   Time of birth: 5:36 PM   Sex: female   Head Delivery Date/Time: 2024  5:36 PM   Delivery type: , Low Transverse   Gestational Age: 40w1d       Delivery Providers    Delivering clinician: Hu Alvarez MD           Measurements    Weight: 4340 g  Weight (lbs): 9 lb 9.1 oz  Length: 53.3 cm  Length (in): 21"  Head circumference: 33 cm  Chest circumference: 36 cm  Abdominal girth: 35 cm         Apgars    Living status: Living  Apgar Component Scores:  1 min.:  5 min.:  10 min.:  15 min.:  20 min.:    Skin color:  0  1       Heart rate:  2  2       Reflex irritability:  2  2       Muscle tone:  2  2       Respiratory effort:  2  2       Total:  8  9       Apgars " assigned by: [facility]         Operative Delivery    Forceps attempted?: No  Vacuum extractor attempted?: No         Shoulder Dystocia    Shoulder dystocia present?: No           Presentation    Presentation: Vertex  Position: Left Occiput Anterior           Interventions/Resuscitation    Method: Bulb Suctioning       Cord    Vessels: 3 vessels  Complications: None  Delayed Cord Clamping?: No  Cord Blood Disposition: Sent with Baby  Gases Sent?: No  Stem Cell Collection (by MD): No       Placenta    Placenta delivery date/time: 2024 1738  Placenta removal: Manual removal  Placenta appearance: Intact  Placenta disposition: Discarded           Labor Events:       labor: No     Labor Onset Date/Time: 2024 20:00     Dilation Complete Date/Time:         Start Pushing Date/Time:       Rupture Date/Time: 24 0809        Rupture type:          Fluid Amount:       Fluid Color: Clear      steroids: None     Antibiotics given for GBS: No     Induction:       Indications for induction:        Augmentation: oxytocin     Indications for augmentation: Ineffective Contraction Pattern     Labor complications: Failure to Progress in Second Stage     Additional complications:          Cervical ripening:                     Delivery:      Episiotomy: None     Indication for Episiotomy:       Perineal Lacerations: None Repaired:      Periurethral Laceration:   Repaired:     Labial Laceration:   Repaired:     Sulcus Laceration:   Repaired:     Vaginal Laceration:   Repaired:     Cervical Laceration:   Repaired:     Repair suture: None     Repair # of packets:       Last Value - EBL - Nursing (mL):       Sum - EBL - Nursing (mL): 0     Last Value - EBL - Anesthesia (mL):      Calculated QBL (mL): 413     Running total QBL (mL): 413     Vaginal Sweep Performed: No     Surgicount Correct:         Other providers:       Anesthesia    Method: General          Details (if applicable):  Trial of Labor  Yes    Categorization: Primary    Priority: Routine   Indications for : Diagnosis of second stage arrest (no descent or rotation)   Incision Type: low transverse     Additional  information:  Forceps:    Vacuum:    Breech:    Observed anomalies    Other (Comments):

## 2024-12-10 NOTE — ASSESSMENT & PLAN NOTE
Consulted Dr. Zhao agreed with plan to repeat CBC in am due to patient afebrile and did receive abx

## 2024-12-10 NOTE — ANESTHESIA POSTPROCEDURE EVALUATION
Anesthesia Post Evaluation    Patient: [place]    Procedure(s) Performed: Procedure(s) (LRB):   SECTION (N/A)    Final Anesthesia Type: general      Patient location during evaluation: PACU  Patient participation: Yes- Able to Participate  Level of consciousness: awake and alert and oriented  Post-procedure vital signs: reviewed and stable  Pain management: adequate  Airway patency: patent  VIJI mitigation strategies: Multimodal analgesia and Use of major conduction anesthesia (spinal/epidural) or peripheral nerve block  PONV status at discharge: No PONV  Anesthetic complications: no      Cardiovascular status: hemodynamically stable  Respiratory status: unassisted and spontaneous ventilation  Hydration status: euvolemic  Follow-up not needed.              Vitals Value Taken Time   /70 24 2238   Temp 36.4 °C (97.6 °F) 24 1857   Pulse 77 24 2323   Resp 17 24 1857   SpO2 97 % 24 2323   Vitals shown include unfiled device data.      Event Time   Out of Recovery 20:10:00         Pain/Nusrat Score: Pain Rating Prior to Med Admin: 1 (2024  9:54 PM)  Pain Rating Post Med Admin: 0 (2024  8:55 AM)

## 2024-12-10 NOTE — PROGRESS NOTES
[facility] -  Labor and Delivery  Obstetrics  Postpartum Progress Note    Patient Name: Negro Park  MRN: [id number]  Admission Date: 2024  Hospital Length of Stay: 1 days  Attending Physician: Hu Alvarez MD  Primary Care Provider: Diane, Primary Doctor    Subjective:     Principal Problem:Failure to progress in labor    Hospital Course:  Pt is a G1 @ 40 weeks 1 days that presented in spontaneous labor. She experienced an arrest of dilatation at 7 cm with an edematous cervix and it was decided to proceed with a low transverse  section. Pt delivered a 9 pound 9.1 ounce female via low transverse   section. She is bottle feeding without difficulty. She is anemic and did receive iron supplementation.     Interval History: Post Op Day 1    She is doing well this evening. She is tolerating a regular diet without nausea or vomiting. She is voiding spontaneously. She is ambulating. She has passed flatus, and has not a BM. Vaginal bleeding is mild. She denies fever or chills. Abdominal pain is mild and controlled with oral medications. She Is not breastfeeding. She desires circumcision for her male baby: not applicable.    Objective:     Vital Signs (Most Recent):  Temp: 98.4 °F (36.9 °C) (12/10/24 1210)  Pulse: 73 (12/10/24 1210)  Resp: 18 (12/10/24 1518)  BP: (!) 94/51 (12/10/24 1210)  SpO2: 98 % (12/10/24 0618) Vital Signs (24h Range):  Temp:  [97.6 °F (36.4 °C)-98.4 °F (36.9 °C)] 98.4 °F (36.9 °C)  Pulse:  [] 73  Resp:  [16-18] 18  SpO2:  [94 %-100 %] 98 %  BP: ()/(51-80) 94/51     Weight: 85.5 kg (188 lb 9.6 oz)  Body mass index is 29.54 kg/m².      Intake/Output Summary (Last 24 hours) at 12/10/2024 1718  Last data filed at 12/10/2024 1455  Gross per 24 hour   Intake --   Output 4313 ml   Net -4313 ml         Significant Labs:  Lab Results   Component Value Date    GROUPTRH O POS 2024    HEPBSAG Non-Reactive 2024     Recent Labs   Lab 12/10/24  0529   HGB  8.2*   HCT 26.6*       I have personallly reviewed all pertinent lab results from the last 24 hours.  Recent Lab Results         12/10/24  0529        Aniso 1+       Bands 4       Baso # 0.05       Basophil % 0.2       Crenated RBC's Few       Differential Method Manual       Eos # 0.04       Eos % 0.2       Hematocrit 26.6       Hemoglobin 8.2       Hypo Few       Immature Grans (Abs) 0.39       Immature Granulocytes 1.5       Lymph # 1.16       Lymph % 4.5        5       MCH 26.9       MCHC 30.8       MCV 87.2       Mono # 2.93       Mono % 11.3        7       MPV 10.4       Neutrophils, Abs 21.31       Neutrophils Relative 82.3       nRBC 0.0       NUCLEATED RBC ABSOLUTE 0.00       PLATELET MORPHOLOGY Few Large Platelets       Platelet Count 218       Poly Few       RBC 3.05       RDW 15.2       Segmented Neutrophils, Man % 84       WBC 25.88               Physical Exam:   Constitutional: She is oriented to person, place, and time. She appears well-developed and well-nourished.        Pulmonary/Chest: Effort normal.        Abdominal:   Incision: dry and intact, no edema, healing well     Genitourinary:    Genitourinary Comments: Uterus: firm, midline, 2 below umbilicus  Lochia: scant rubra noted on perineal pad             Musculoskeletal: Moves all extremeties.       Neurological: She is alert and oriented to person, place, and time.    Skin: Skin is warm and dry.    Psychiatric: She has a normal mood and affect. Her behavior is normal. Judgment and thought content normal.       Review of Systems   All other systems reviewed and are negative.    Assessment/Plan:     24 y.o. female  for:    * Failure to progress in labor  Proceed with primary  Section  Risk of  section discussed to include pain, infection, injury to bladder and or bowel, injury to other organs.   Informed to being ambulation when allowed post delivery.  Verbalized understanding to all instructions and information  Questions  answered to desired level of satisfaction  Dr. Zhao aware of plan of care and mutually agrees    Status post primary low transverse  section  Routine postoperative care  Increase ambulation    Leukocytosis  Consulted Dr. Zhao agreed with plan to repeat CBC in am due to patient afebrile and did receive abx     Pelvic pain affecting pregnancy in third trimester, antepartum  Patient admitted in labor  Routine L&D order set  Expect   Desires Epidural    Rh+ (O+)  Rubella Immune  GBS negative    40 weeks gestation of pregnancy    Rh+ (O+)  Rubella Immune  GBS negative    PMHx: IBS-C    Consent for admission, vaginal delivery,  if needed and blood products if needed   Patient and her mother's questions encouraged and answered to their apparent satisfaction      History of herpes genitalis    NO lesions seen by Dr Lowe's exam or my exam on admission - see PE  Counseled on ACOG recommendations    After counseling on labor/vaginal delivery patient willing to attempt vaginal delivery          Disposition: As patient meets milestones, will plan to discharge tomorrow if stable.    Mariia Reid CNM  Obstetrics  [facility] -  Labor and Delivery

## 2024-12-11 VITALS
WEIGHT: 188.63 LBS | HEART RATE: 114 BPM | RESPIRATION RATE: 18 BRPM | SYSTOLIC BLOOD PRESSURE: 111 MMHG | DIASTOLIC BLOOD PRESSURE: 72 MMHG | HEIGHT: 67 IN | OXYGEN SATURATION: 98 % | BODY MASS INDEX: 29.61 KG/M2 | TEMPERATURE: 97 F

## 2024-12-11 LAB
BASOPHILS # BLD AUTO: 0.04 K/UL (ref 0–0.2)
BASOPHILS NFR BLD AUTO: 0.2 % (ref 0–1)
DIFFERENTIAL METHOD BLD: ABNORMAL
EOSINOPHIL # BLD AUTO: 0.05 K/UL (ref 0–0.5)
EOSINOPHIL NFR BLD AUTO: 0.2 % (ref 1–4)
ERYTHROCYTE [DISTWIDTH] IN BLOOD BY AUTOMATED COUNT: 15.5 % (ref 11.5–14.5)
HCT VFR BLD AUTO: 24.8 % (ref 38–47)
HGB BLD-MCNC: 7.6 G/DL (ref 12–16)
IMM GRANULOCYTES # BLD AUTO: 0.41 K/UL (ref 0–0.04)
IMM GRANULOCYTES NFR BLD: 1.9 % (ref 0–0.4)
LYMPHOCYTES # BLD AUTO: 2.06 K/UL (ref 1–4.8)
LYMPHOCYTES NFR BLD AUTO: 9.5 % (ref 27–41)
MCH RBC QN AUTO: 27.3 PG (ref 27–31)
MCHC RBC AUTO-ENTMCNC: 30.6 G/DL (ref 32–36)
MCV RBC AUTO: 89.2 FL (ref 80–96)
MONOCYTES # BLD AUTO: 2.02 K/UL (ref 0–0.8)
MONOCYTES NFR BLD AUTO: 9.3 % (ref 2–6)
MPC BLD CALC-MCNC: 10.6 FL (ref 9.4–12.4)
NEUTROPHILS # BLD AUTO: 17.18 K/UL (ref 1.8–7.7)
NEUTROPHILS NFR BLD AUTO: 78.9 % (ref 53–65)
NRBC # BLD AUTO: 0 X10E3/UL
NRBC, AUTO (.00): 0 %
PLATELET # BLD AUTO: 218 K/UL (ref 150–400)
RBC # BLD AUTO: 2.78 M/UL (ref 4.2–5.4)
WBC # BLD AUTO: 21.76 K/UL (ref 4.5–11)

## 2024-12-11 PROCEDURE — 25000003 PHARM REV CODE 250: Performed by: OBSTETRICS & GYNECOLOGY

## 2024-12-11 PROCEDURE — 36415 COLL VENOUS BLD VENIPUNCTURE: CPT | Performed by: ADVANCED PRACTICE MIDWIFE

## 2024-12-11 PROCEDURE — 85025 COMPLETE CBC W/AUTO DIFF WBC: CPT | Performed by: ADVANCED PRACTICE MIDWIFE

## 2024-12-11 RX ORDER — IBUPROFEN 800 MG/1
800 TABLET ORAL EVERY 8 HOURS PRN
Status: DISCONTINUED | OUTPATIENT
Start: 2024-12-11 | End: 2024-12-11 | Stop reason: HOSPADM

## 2024-12-11 RX ORDER — OXYCODONE AND ACETAMINOPHEN 5; 325 MG/1; MG/1
1 TABLET ORAL EVERY 6 HOURS PRN
Qty: 25 TABLET | Refills: 0 | Status: SHIPPED | OUTPATIENT
Start: 2024-12-11

## 2024-12-11 RX ORDER — IBUPROFEN 800 MG/1
800 TABLET ORAL EVERY 8 HOURS PRN
Qty: 60 TABLET | Refills: 1 | Status: SHIPPED | OUTPATIENT
Start: 2024-12-11

## 2024-12-11 RX ORDER — CLINDAMYCIN HYDROCHLORIDE 300 MG/1
300 CAPSULE ORAL 2 TIMES DAILY
Qty: 14 CAPSULE | Refills: 0 | Status: SHIPPED | OUTPATIENT
Start: 2024-12-11 | End: 2024-12-18

## 2024-12-11 RX ADMIN — BISACODYL 10 MG: 10 SUPPOSITORY RECTAL at 08:12

## 2024-12-11 RX ADMIN — HYDROCODONE BITARTRATE AND ACETAMINOPHEN 1 TABLET: 7.5; 325 TABLET ORAL at 01:12

## 2024-12-11 RX ADMIN — Medication 1 TABLET: at 08:12

## 2024-12-11 RX ADMIN — VALACYCLOVIR HYDROCHLORIDE 1000 MG: 500 TABLET, FILM COATED ORAL at 08:12

## 2024-12-11 RX ADMIN — HYDROCODONE BITARTRATE AND ACETAMINOPHEN 1 TABLET: 7.5; 325 TABLET ORAL at 11:12

## 2024-12-11 RX ADMIN — MAGNESIUM HYDROXIDE 2400 MG: 400 SUSPENSION ORAL at 11:12

## 2024-12-11 RX ADMIN — IBUPROFEN 800 MG: 800 TABLET, FILM COATED ORAL at 11:12

## 2024-12-11 RX ADMIN — HYDROCODONE BITARTRATE AND ACETAMINOPHEN 1 TABLET: 7.5; 325 TABLET ORAL at 06:12

## 2024-12-11 RX ADMIN — DOCUSATE SODIUM 200 MG: 100 CAPSULE, LIQUID FILLED ORAL at 08:12

## 2024-12-11 NOTE — LACTATION NOTE
This note was copied from a baby's chart.  Breastfeeding rounds done, mom pumping, does not wish to latch infant, encouraged mom to pump both breasts at least 8 times per day, mom to call with any needs, mom denies questions or concerns, mom to call with any needs

## 2024-12-11 NOTE — PLAN OF CARE
Problem:  Fall Injury Risk  Goal: Absence of Fall, Infant Drop and Related Injury  12/10/2024 1947 by Elia Winters RN  Outcome: Progressing  12/10/2024 1946 by Elia Winters RN  Outcome: Progressing     Problem: Adult Inpatient Plan of Care  Goal: Plan of Care Review  12/10/2024 1947 by Elia Winters RN  Outcome: Progressing  12/10/2024 1946 by Elia Winters RN  Outcome: Progressing  Goal: Patient-Specific Goal (Individualized)  12/10/2024 1947 by Elia Winters RN  Outcome: Progressing  12/10/2024 1946 by Elia Winters RN  Outcome: Progressing  Goal: Absence of Hospital-Acquired Illness or Injury  12/10/2024 1947 by Elia Winters RN  Outcome: Progressing  12/10/2024 1946 by Elia Winters RN  Outcome: Progressing  Goal: Optimal Comfort and Wellbeing  12/10/2024 1947 by Elia Winters RN  Outcome: Progressing  12/10/2024 1946 by Elia Winters RN  Outcome: Progressing  Goal: Readiness for Transition of Care  12/10/2024 1947 by Elia Winters RN  Outcome: Progressing  12/10/2024 1946 by Elia Winters RN  Outcome: Progressing     Problem: Infection  Goal: Absence of Infection Signs and Symptoms  12/10/2024 1947 by Elia Winters RN  Outcome: Progressing  12/10/2024 1946 by Elia Winters RN  Outcome: Progressing     Problem: Postpartum ( Delivery)  Goal: Successful Parent Role Transition  12/10/2024 1947 by Elia Winters RN  Outcome: Progressing  12/10/2024 1946 by Elia Winters RN  Outcome: Progressing  Goal: Hemostasis  12/10/2024 1947 by Elia Winters RN  Outcome: Progressing  12/10/2024 1946 by Elia Winters RN  Outcome: Progressing  Goal: Effective Bowel Elimination  12/10/2024 1947 by Elia Winters RN  Outcome: Progressing  12/10/2024 1946 by Gallaspy, Malori C, RN  Outcome: Progressing  Goal: Fluid and Electrolyte Balance  12/10/2024 1947 by Elia Winters, RN  Outcome:  Progressing  12/10/2024 1946 by Elia Winters RN  Outcome: Progressing  Goal: Absence of Infection Signs and Symptoms  12/10/2024 1947 by Elia Winters RN  Outcome: Progressing  12/10/2024 1946 by Elia Winters RN  Outcome: Progressing  Goal: Anesthesia/Sedation Recovery  12/10/2024 1947 by Elia Winters, RN  Outcome: Progressing  12/10/2024 1946 by Elia Winters RN  Outcome: Progressing  Goal: Optimal Pain Control and Function  12/10/2024 1947 by Elia Winters RN  Outcome: Progressing  12/10/2024 1946 by Elia Winters RN  Outcome: Progressing  Goal: Nausea and Vomiting Relief  12/10/2024 1947 by Elia Winters RN  Outcome: Progressing  12/10/2024 1946 by Elia Winters RN  Outcome: Progressing  Goal: Effective Urinary Elimination  12/10/2024 1947 by Elia Winters, RN  Outcome: Progressing  12/10/2024 1946 by Elia Winters RN  Outcome: Progressing  Goal: Effective Oxygenation and Ventilation  12/10/2024 1947 by Elia Winters RN  Outcome: Progressing  12/10/2024 1946 by Elia Winters RN  Outcome: Progressing     Problem: Wound  Goal: Optimal Coping  12/10/2024 1947 by Elia Winters, RN  Outcome: Progressing  12/10/2024 1946 by Elia Winters RN  Outcome: Progressing  Goal: Optimal Functional Ability  12/10/2024 1947 by Elia Winters RN  Outcome: Progressing  12/10/2024 1946 by Elia Winters, RN  Outcome: Progressing  Goal: Absence of Infection Signs and Symptoms  12/10/2024 1947 by Elia Winters, RN  Outcome: Progressing  12/10/2024 1946 by Elia Winters RN  Outcome: Progressing  Goal: Improved Oral Intake  12/10/2024 1947 by Elia Winters, RN  Outcome: Progressing  12/10/2024 1946 by Gallaspy, Malori C, RN  Outcome: Progressing  Goal: Optimal Pain Control and Function  12/10/2024 1947 by Elia Winters RN  Outcome: Progressing  12/10/2024 1946 by Elia Winters, RN  Outcome: Progressing  Goal: Skin  Health and Integrity  12/10/2024 1947 by Elia Winters RN  Outcome: Progressing  12/10/2024 1946 by Elia Winters RN  Outcome: Progressing  Goal: Optimal Wound Healing  12/10/2024 1947 by Elia Winters RN  Outcome: Progressing  12/10/2024 1946 by Elia Winters RN  Outcome: Progressing

## 2024-12-11 NOTE — DISCHARGE INSTRUCTIONS
Topic Page  Nurse Date Time Comments   PP Education Booklet Given ---- ER 12/11/2024 Discharge booklet given and discussion on topics done.    Skin to skin 18 ER     Rooming in 29 ER     Importance of Exclusive Breastfeeding for 6 mo 35 ER     Bleeding 6 ER     Incision Care 10 ER     Sex 11 ER     Pain Management 8 ER     Perineal Care 9 ER     Minimizing Engorgement 40 ER     Collection & Storage of BM 42-43 ER     S/S of BF Problems  ER     Hand Expression 42 ER     Maternal s/s breast problems 41 ER     Mgnt of Common BF Problems (engorgement, sore & cracked nipples) 40-41 ER     PPD/Anxiety 14-15 ER

## 2024-12-11 NOTE — DISCHARGE SUMMARY
[facility] -  Labor and Delivery  Obstetrics  Discharge Summary      Patient Name: Negro Park  MRN: [id number]  Admission Date: 2024  Hospital Length of Stay: 2 days  Discharge Date and Time: No discharge date for patient encounter.  Attending Physician: Hu Alvarez MD   Discharging Provider: Mariia Reid CNM   Primary Care Provider: Diaen Primary Doctor    HPI:      Expand All Collapse All    Encounter Date: 2024     PATI Physician: Christian Smith   Primary OBGYN:Mariia Reid CNM     Admit Diagnosis/Chief Complaint: Contractions  History          Chief Complaint   Patient presents with    Contractions         25yo  at 40w1d seen around 0300  and found to have q6-10' contractions unchanged from office visit.  Returns with Ctx q2-3 min.     Patient nervous about Hx of HSV.  Dr Lowe did a speculum exam at last PATI visit  and found no lesions.  She did take her Valtrex yesterday but has missed some doses.  Has NO prodromal symptoms.              Procedure(s) (LRB):   SECTION (N/A)     Hospital Course:   Pt is a G1 @ 40 weeks 1 days that presented in spontaneous labor. She experienced an arrest of dilatation at 7 cm with an edematous cervix and it was decided to proceed with a low transverse  section. Pt delivered a 9 pound 9.1 ounce female via low transverse   section. She is bottle feeding without difficulty. She is anemic and did receive iron supplementation. She is being discharged home with clindamycin secondary to elevated WBC post delivery, leukocytosis. She is to continue iron supplementation secondary to anemia     Consults (From admission, onward)          Status Ordering Provider     Inpatient consult to Anesthesiology  Once        Provider:  (Not yet assigned)    Acknowledged CHRISTIAN SMITH            Final Active Diagnoses:    Diagnosis Date Noted POA    PRINCIPAL PROBLEM:  Failure to progress in labor [O62.2] 2024 Unknown     "Leukocytosis [D72.829] 12/10/2024 Unknown    Status post primary low transverse  section [Z98.891] 12/10/2024 Not Applicable    Pelvic pain affecting pregnancy in third trimester, antepartum [O26.893, R10.2] 2024 Yes    History of herpes genitalis [Z86.19] 2024 Yes    40 weeks gestation of pregnancy [Z3A.40] 2024 Not Applicable      Problems Resolved During this Admission:        Significant Diagnostic Studies: Labs: CBC   Recent Labs   Lab 12/10/24  0529 24  0428   WBC 25.88* 21.76*   HGB 8.2* 7.6*   HCT 26.6* 24.8*    218         Feeding Method: both breast and bottle    Immunizations       Date Immunization Status Dose Route/Site Given by    24 MMR Incomplete 0.5 mL Subcutaneous/     24 Tdap Incomplete 0.5 mL Intramuscular/             Delivery:    Episiotomy: None   Lacerations: None   Repair suture: None   Repair # of packets:     Blood loss (ml):       Birth information:  YOB: 2024   Time of birth: 5:36 PM   Sex: female   Delivery type: , Low Transverse   Gestational Age: 40w1d     Measurements    Weight: 4340 g  Weight (lbs): 9 lb 9.1 oz  Length: 53.3 cm  Length (in): 21"  Head circumference: 33 cm  Chest circumference: 36 cm  Abdominal girth: 35 cm         Delivery Clinician: Delivery Providers    Delivering clinician: Hu Alvarez MD          Additional  information:  Forceps:    Vacuum:    Breech:    Observed anomalies      Living?:     Apgars    Living status: Living  Apgar Component Scores:  1 min.:  5 min.:  10 min.:  15 min.:  20 min.:    Skin color:  0  1       Heart rate:  2  2       Reflex irritability:  2  2       Muscle tone:  2  2       Respiratory effort:  2  2       Total:  8  9       Apgars assigned by: ADRIÁN LARIOS         Placenta: Delivered:       appearance  Pending Diagnostic Studies:       None            Discharged Condition: good    Disposition: Home or Self Care    Follow Up:   Follow-up " Information       Mariia Reid CNM Follow up in 1 week(s).    Specialty: Obstetrics and Gynecology  Why: Postpartum Visit and status post CS  Contact information:  [address] [facility] [address]  [phone]                           Patient Instructions:      Diet Adult Regular     Lifting restrictions   Order Comments: Don't lift anything heavier than your baby     No driving until:   Order Comments: Until seen by provider     Pelvic Rest   Order Comments: No sex, nothing in vagina until after 7-8 weeks post operation     No dressing needed   Order Comments: Keep incision clean and dry     Notify your health care provider if you experience any of the following:  temperature >100.4     Notify your health care provider if you experience any of the following:  persistent nausea and vomiting or diarrhea     Notify your health care provider if you experience any of the following:  severe uncontrolled pain     Notify your health care provider if you experience any of the following:  redness, tenderness, or signs of infection (pain, swelling, redness, odor or green/yellow discharge around incision site)     Notify your health care provider if you experience any of the following:  difficulty breathing or increased cough     Notify your health care provider if you experience any of the following:  severe persistent headache     Notify your health care provider if you experience any of the following:  worsening rash     Notify your health care provider if you experience any of the following:  persistent dizziness, light-headedness, or visual disturbances     Notify your health care provider if you experience any of the following:  increased confusion or weakness     Notify your health care provider if you experience any of the following:     Activity as tolerated     Medications:  Current Discharge Medication List        START taking these medications    Details   clindamycin (CLEOCIN) 300 MG capsule Take 1 capsule  (300 mg total) by mouth 2 (two) times a day. for 7 days  Qty: 14 capsule, Refills: 0      ibuprofen (ADVIL,MOTRIN) 800 MG tablet Take 1 tablet (800 mg total) by mouth every 8 (eight) hours as needed.  Qty: 60 tablet, Refills: 1      oxyCODONE-acetaminophen (PERCOCET) 5-325 mg per tablet Take 1 tablet by mouth every 6 (six) hours as needed for Pain.  Qty: 25 tablet, Refills: 0    Comments: Quantity prescribed more than 7 day supply? No  Associated Diagnoses: Status post primary low transverse  section           CONTINUE these medications which have NOT CHANGED    Details   ferrous sulfate 325 (65 FE) MG EC tablet Take 1 tablet (325 mg total) by mouth 2 (two) times daily.  Qty: 60 tablet, Refills: 4      ondansetron (ZOFRAN-ODT) 8 MG TbDL Take 1 tablet (8 mg total) by mouth 3 (three) times daily.  Qty: 30 tablet, Refills: 2    Associated Diagnoses: Nausea      PNV no.153/FA/om3/dha/epa/fish (PRENATAL GUMMIES ORAL) Take by mouth.      valACYclovir (VALTREX) 1000 MG tablet Take 1 tablet (1,000 mg total) by mouth once daily.  Qty: 30 tablet, Refills: 11             Mariia Reid CNM  Obstetrics  [facility] -  Labor and Delivery

## 2024-12-12 NOTE — PLAN OF CARE
Problem:  Fall Injury Risk  Goal: Absence of Fall, Infant Drop and Related Injury  Outcome: Met     Problem: Adult Inpatient Plan of Care  Goal: Plan of Care Review  Outcome: Met  Goal: Patient-Specific Goal (Individualized)  Outcome: Met  Goal: Absence of Hospital-Acquired Illness or Injury  Outcome: Met  Goal: Optimal Comfort and Wellbeing  Outcome: Met  Goal: Readiness for Transition of Care  Outcome: Met     Problem: Infection  Goal: Absence of Infection Signs and Symptoms  Outcome: Met     Problem: Postpartum ( Delivery)  Goal: Successful Parent Role Transition  Outcome: Met  Goal: Hemostasis  Outcome: Met  Goal: Effective Bowel Elimination  Outcome: Met  Goal: Fluid and Electrolyte Balance  Outcome: Met  Goal: Absence of Infection Signs and Symptoms  Outcome: Met  Goal: Anesthesia/Sedation Recovery  Outcome: Met  Goal: Optimal Pain Control and Function  Outcome: Met  Goal: Nausea and Vomiting Relief  Outcome: Met  Goal: Effective Urinary Elimination  Outcome: Met  Goal: Effective Oxygenation and Ventilation  Outcome: Met     Problem: Wound  Goal: Optimal Coping  Outcome: Met  Goal: Optimal Functional Ability  Outcome: Met  Goal: Absence of Infection Signs and Symptoms  Outcome: Met  Goal: Improved Oral Intake  Outcome: Met  Goal: Optimal Pain Control and Function  Outcome: Met  Goal: Skin Health and Integrity  Outcome: Met  Goal: Optimal Wound Healing  Outcome: Met

## 2025-01-08 ENCOUNTER — POSTPARTUM VISIT (OUTPATIENT)
Dept: OBSTETRICS AND GYNECOLOGY | Facility: CLINIC | Age: 25
End: 2025-01-08
Payer: MEDICAID

## 2025-01-08 VITALS
HEART RATE: 96 BPM | OXYGEN SATURATION: 98 % | WEIGHT: 167.81 LBS | BODY MASS INDEX: 26.34 KG/M2 | SYSTOLIC BLOOD PRESSURE: 110 MMHG | DIASTOLIC BLOOD PRESSURE: 70 MMHG | TEMPERATURE: 98 F | RESPIRATION RATE: 16 BRPM | HEIGHT: 67 IN

## 2025-01-08 PROCEDURE — 0503F POSTPARTUM CARE VISIT: CPT | Mod: ,,, | Performed by: ADVANCED PRACTICE MIDWIFE

## 2025-01-08 NOTE — PROGRESS NOTES
"CC: Post-partum follow-up    Negro Park is a 24 y.o. female  who presents for post-partum visit.  She is S/P a repeat  section.  She and the baby are both doing well.  No pain.  No fever.   No bowel / bladder complaints.    Delivery Date: 2024  Delivery MD: DR. Zhao  Gender: female  Birth Weight: 9 pounds 9 ounces  Breast Feeding: YES  Depression: NO  Contraception: no method    Pregnancy was complicated by:  None    /70 (Patient Position: Sitting)   Pulse 96   Temp 98.2 °F (36.8 °C) (Oral)   Resp 16   Ht 5' 7" (1.702 m)   Wt 76.1 kg (167 lb 12.8 oz)   LMP 2024 (Exact Date)   SpO2 98%   Breastfeeding No   BMI 26.28 kg/m²     ROS:  GENERAL: No fever, chills, fatigability.  VULVAR: No pain, no lesions and no itching.  VAGINAL: No relaxation, no itching, no discharge, no abnormal bleeding and no lesions.  ABDOMEN: No abdominal pain. Denies nausea. Denies vomiting. No diarrhea. No constipation  BREAST: Denies pain. No lumps. No discharge.  URINARY: No incontinence, no nocturia, no frequency and no dysuria.  CARDIOVASCULAR: No chest pain. No shortness of breath. No leg cramps.  NEUROLOGICAL: No headaches. No vision changes.    PHYSICAL EXAM:  ABDOMEN:  Soft, non-tender, non-distended  VULVA:  Normal, no lesions  CERVIX:  Without lesions, polyps or tenderness.  UTERUS:  Normal size, shape, consistency, no mass or tenderness.  ADNEXA:  Normal in size without mass or tenderness    IMP:  4 Weeks Postpartum  Status Post   Postpartum exam        ICD-10-CM ICD-9-CM    1. Postpartum exam  Z39.2 V24.2           PLAN:  May resume normal activities after 8 weeks postpartum/post  section  May be released to return to work 8 weeks postpartum/post  section  Birth control options and counseling discussed  Verbalized understanding to all information and instructions  Questions answered to desired level of satisfaction    Follow up in about 4 months (around " 5/12/2025), or if symptoms worsen or fail to improve, for annual exam.

## 2025-05-08 ENCOUNTER — OFFICE VISIT (OUTPATIENT)
Dept: OBSTETRICS AND GYNECOLOGY | Facility: CLINIC | Age: 25
End: 2025-05-08
Payer: MEDICAID

## 2025-05-08 VITALS
BODY MASS INDEX: 24.17 KG/M2 | TEMPERATURE: 98 F | SYSTOLIC BLOOD PRESSURE: 122 MMHG | RESPIRATION RATE: 16 BRPM | OXYGEN SATURATION: 98 % | DIASTOLIC BLOOD PRESSURE: 77 MMHG | WEIGHT: 154 LBS | HEIGHT: 67 IN | HEART RATE: 67 BPM

## 2025-05-08 DIAGNOSIS — Z01.419 ENCOUNTER FOR WELL WOMAN EXAM WITH ROUTINE GYNECOLOGICAL EXAM: Primary | ICD-10-CM

## 2025-05-08 NOTE — PROGRESS NOTES
"CC: Here for pap smear, annual exam    Negro Park is a 24 y.o. female  presents for well woman exam.  LMP: Patient's last menstrual period was 2025 (exact date).. Menses are: monthly without difficulties and normal flow for patient. Denies any further issues, problems, or complaints.    Last mammogram: n/a  Colonoscopy: n/a    Past Medical History:   Diagnosis Date    History of irritable bowel syndrome      Past Surgical History:   Procedure Laterality Date     SECTION N/A 2024    Procedure:  SECTION;  Surgeon: Hu Alvarez MD;  Location: [facility] L&D;  Service: OB/GYN;  Laterality: N/A;     Social History[1]  Family History   Problem Relation Name Age of Onset    Ovarian cancer Maternal Grandmother      Diabetes Father      Hypertension Mother      Hypertension Sister       OB History          1    Para   1    Term   1            AB        Living   1         SAB        IAB        Ectopic        Multiple   0    Live Births   1                 /77   Pulse 67   Temp 97.7 °F (36.5 °C) (Oral)   Resp 16   Ht 5' 7" (1.702 m)   Wt 69.9 kg (154 lb)   LMP 2025 (Exact Date)   SpO2 98%   Breastfeeding No   BMI 24.12 kg/m²       ROS:  GENERAL: Denies weight gain or weight loss. Feeling well overall.   SKIN: Denies rash or lesions.   HEAD: Denies head injury or headache.   NODES: Denies enlarged lymph nodes.   CHEST: Denies chest pain or shortness of breath.   CARDIOVASCULAR: Denies palpitations or left sided chest pain.   ABDOMEN: No abdominal pain, constipation, diarrhea, nausea, vomiting or rectal bleeding.   URINARY: No frequency, dysuria, hematuria, or burning on urination.  REPRODUCTIVE: See HPI.   BREASTS: The patient performs breast self-examination and denies pain, lumps, or nipple discharge.   HEMATOLOGIC: No easy bruisability or excessive bleeding.   MUSCULOSKELETAL: Denies joint pain or swelling.   NEUROLOGIC: Denies syncope or " weakness.   PSYCHIATRIC: Denies depression, anxiety or mood swings.    PHYSICAL EXAM:  APPEARANCE: Well nourished, well developed, in no acute distress.  AFFECT: WNL, alert and oriented x 3  SKIN: No acne or hirsutism  NECK: Neck symmetric without masses or thyromegaly  NODES: No inguinal, cervical, axillary, or femoral lymph node enlargement  CHEST: Good respiratory effect  ABDOMEN: Soft.  No tenderness or masses.  No hepatosplenomegaly.  No hernias.  BREASTS: Symmetrical, no skin changes or visible lesions.  No palpable masses, nipple discharge bilaterally.  PELVIC: Normal external genitalia without lesions.  Normal hair distribution.  Adequate perineal body, normal urethral meatus.  Vagina moist and well rugated without lesions, no discharge.  Cervix pink, without lesions, tenderness with no discharge.  No significant cystocele or rectocele.  Bimanual exam shows uterus to be normal size, regular, mobile and nontender.  Adnexa without masses or tenderness.  Chaperone present for exam  EXTREMITIES: No edema.    Encounter for well woman exam with routine gynecological exam  -     ThinPrep Pap Test; Future; Expected date: 05/09/2025        ICD-10-CM ICD-9-CM    1. Encounter for well woman exam with routine gynecological exam  Z01.419 V72.31 ThinPrep Pap Test      ThinPrep Pap Test          Patient was counseled today on A.C.S. Pap guidelines and recommendations for yearly pelvic exams, mammograms and monthly self breast exams; to see her PCP for other health maintenance.   Exercise regimen encouraged  Healthy food choices encouraged  Prenatal vitamins daily  Vitamin D daily  Questions answered to desired level of satisfaction  Verbalized understanding to all information and instructions    Follow up in about 1 year (around 5/8/2026), or if symptoms worsen or fail to improve, for Annual Exam.                         [1]   Social History  Socioeconomic History    Marital status: Significant Other   Tobacco Use     Smoking status: Former     Types: Cigarettes     Passive exposure: Never    Smokeless tobacco: Never   Substance and Sexual Activity    Alcohol use: Not Currently    Drug use: Never    Sexual activity: Not Currently     Partners: Male

## (undated) DEVICE — APPLICATOR CHLORAPREP ORN 26ML

## (undated) DEVICE — SUT VICRYL PLUS 1 CTX 36IN

## (undated) DEVICE — SUT 2/0 27IN PLAIN GUT CT

## (undated) DEVICE — SOL NACL IRR 1000ML BTL

## (undated) DEVICE — STRIP MEDI WND CLSR 1/4X4IN

## (undated) DEVICE — HEMOSTAT SURGICEL 4X8IN

## (undated) DEVICE — PACK C SECTION RUSH

## (undated) DEVICE — GLOVE PROTEXIS PI SYN SURG 7

## (undated) DEVICE — ELECTRODE REM PLYHSV RETURN 9

## (undated) DEVICE — GLOVE PROTEXIS PI SYN SURG 6.5

## (undated) DEVICE — STAPLER SKIN SUBCUTICULAR

## (undated) DEVICE — CANISTER 1200 SUCTION CCMEDI-V